# Patient Record
Sex: MALE | Race: WHITE | Employment: FULL TIME | URBAN - METROPOLITAN AREA
[De-identification: names, ages, dates, MRNs, and addresses within clinical notes are randomized per-mention and may not be internally consistent; named-entity substitution may affect disease eponyms.]

---
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
## 2019-01-07 ENCOUNTER — OFFICE VISIT (OUTPATIENT)
Dept: FAMILY MEDICINE | Facility: CLINIC | Age: 19
End: 2019-01-07
Payer: COMMERCIAL

## 2019-01-07 VITALS
SYSTOLIC BLOOD PRESSURE: 126 MMHG | WEIGHT: 281 LBS | HEART RATE: 75 BPM | DIASTOLIC BLOOD PRESSURE: 86 MMHG | BODY MASS INDEX: 37.24 KG/M2 | TEMPERATURE: 98.7 F | OXYGEN SATURATION: 96 % | HEIGHT: 73 IN

## 2019-01-07 DIAGNOSIS — F32.0 MAJOR DEPRESSIVE DISORDER, SINGLE EPISODE, MILD (H): Primary | ICD-10-CM

## 2019-01-07 DIAGNOSIS — E66.01 MORBID OBESITY (H): ICD-10-CM

## 2019-01-07 DIAGNOSIS — R03.0 ELEVATED BLOOD PRESSURE READING WITHOUT DIAGNOSIS OF HYPERTENSION: ICD-10-CM

## 2019-01-07 DIAGNOSIS — Z51.81 MEDICATION MONITORING ENCOUNTER: ICD-10-CM

## 2019-01-07 PROBLEM — E66.811 CLASS 1 OBESITY WITHOUT SERIOUS COMORBIDITY WITH BODY MASS INDEX (BMI) OF 34.0 TO 34.9 IN ADULT, UNSPECIFIED OBESITY TYPE: Status: ACTIVE | Noted: 2019-01-07

## 2019-01-07 PROCEDURE — 99214 OFFICE O/P EST MOD 30 MIN: CPT | Performed by: FAMILY MEDICINE

## 2019-01-07 ASSESSMENT — ANXIETY QUESTIONNAIRES
2. NOT BEING ABLE TO STOP OR CONTROL WORRYING: NOT AT ALL
GAD7 TOTAL SCORE: 2
7. FEELING AFRAID AS IF SOMETHING AWFUL MIGHT HAPPEN: NOT AT ALL
1. FEELING NERVOUS, ANXIOUS, OR ON EDGE: NOT AT ALL
6. BECOMING EASILY ANNOYED OR IRRITABLE: SEVERAL DAYS
5. BEING SO RESTLESS THAT IT IS HARD TO SIT STILL: NOT AT ALL
IF YOU CHECKED OFF ANY PROBLEMS ON THIS QUESTIONNAIRE, HOW DIFFICULT HAVE THESE PROBLEMS MADE IT FOR YOU TO DO YOUR WORK, TAKE CARE OF THINGS AT HOME, OR GET ALONG WITH OTHER PEOPLE: NOT DIFFICULT AT ALL
3. WORRYING TOO MUCH ABOUT DIFFERENT THINGS: SEVERAL DAYS

## 2019-01-07 ASSESSMENT — PATIENT HEALTH QUESTIONNAIRE - PHQ9
SUM OF ALL RESPONSES TO PHQ QUESTIONS 1-9: 5
5. POOR APPETITE OR OVEREATING: NOT AT ALL

## 2019-01-07 ASSESSMENT — MIFFLIN-ST. JEOR: SCORE: 2343.49

## 2019-01-07 NOTE — LETTER
My Depression Action Plan  Name: John Paul Durham   Date of Birth 2000  Date: 1/7/2019    My doctor: Lm Bear   My clinic: [address] [id number] [phone]          GREEN    ZONE   Good Control    What it looks like:     Things are going generally well. You have normal up s and down s. You may even feel depressed from time to time, but bad moods usually last less than a day.   What you need to do:  1. Continue to care for yourself (see self care plan)  2. Check your depression survival kit and update it as needed  3. Follow your physician s recommendations including any medication.  4. Do not stop taking medication unless you consult with your physician first.           YELLOW         ZONE Getting Worse    What it looks like:     Depression is starting to interfere with your life.     It may be hard to get out of bed; you may be starting to isolate yourself from others.    Symptoms of depression are starting to last most all day and this has happened for several days.     You may have suicidal thoughts but they are not constant.   What you need to do:     1. Call your care team, your response to treatment will improve if you keep your care team informed of your progress. Yellow periods are signs an adjustment may need to be made.     2. Continue your self-care, even if you have to fake it!    3. Talk to someone in your support network    4. Open up your depression survival kit           RED    ZONE Medical Alert - Get Help    What it looks like:     Depression is seriously interfering with your life.     You may experience these or other symptoms: You can t get out of bed most days, can t work or engage in other necessary activities, you have trouble taking care of basic hygiene, or basic responsibilities, thoughts of suicide or death that will not go away, self-injurious behavior.     What you need to do:  1. Call your care team  and request a same-day appointment. If they are not available (weekends or after hours) call your local crisis line, emergency room or 911.            Depression Self Care Plan / Survival Kit    Self-Care for Depression  Here s the deal. Your body and mind are really not as separate as most people think.  What you do and think affects how you feel and how you feel influences what you do and think. This means if you do things that people who feel good do, it will help you feel better.  Sometimes this is all it takes.  There is also a place for medication and therapy depending on how severe your depression is, so be sure to consult with your medical provider and/ or Behavioral Health Consultant if your symptoms are worsening or not improving.     In order to better manage my stress, I will:    Exercise  Get some form of exercise, every day. This will help reduce pain and release endorphins, the  feel good  chemicals in your brain. This is almost as good as taking antidepressants!  This is not the same as joining a gym and then never going! (they count on that by the way ) It can be as simple as just going for a walk or doing some gardening, anything that will get you moving.      Hygiene   Maintain good hygiene (Get out of bed in the morning, Make your bed, Brush your teeth, Take a shower, and Get dressed like you were going to work, even if you are unemployed).  If your clothes don't fit try to get ones that do.    Diet  I will strive to eat foods that are good for me, drink plenty of water, and avoid excessive sugar, caffeine, alcohol, and other mood-altering substances.  Some foods that are helpful in depression are: complex carbohydrates, B vitamins, flaxseed, fish or fish oil, fresh fruits and vegetables.    Psychotherapy  I agree to participate in Individual Therapy (if recommended).    Medication  If prescribed medications, I agree to take them.  Missing doses can result in serious side effects.  I understand  that drinking alcohol, or other illicit drug use, may cause potential side effects.  I will not stop my medication abruptly without first discussing it with my provider.    Staying Connected With Others  I will stay in touch with my friends, family members, and my primary care provider/team.    Use your imagination  Be creative.  We all have a creative side; it doesn t matter if it s oil painting, sand castles, or mud pies! This will also kick up the endorphins.    Witness Beauty  (AKA stop and smell the roses) Take a look outside, even in mid-winter. Notice colors, textures. Watch the squirrels and birds.     Service to others  Be of service to others.  There is always someone else in need.  By helping others we can  get out of ourselves  and remember the really important things.  This also provides opportunities for practicing all the other parts of the program.    Humor  Laugh and be silly!  Adjust your TV habits for less news and crime-drama and more comedy.    Control your stress  Try breathing deep, massage therapy, biofeedback, and meditation. Find time to relax each day.     My support system    Clinic Contact:  Phone number:    Contact 1:  Phone number:    Contact 2:  Phone number:    Voodoo/:  Phone number:    Therapist:  Phone number:    Local crisis center:    Phone number:    Other community support:  Phone number:

## 2019-01-07 NOTE — PATIENT INSTRUCTIONS
Prescription refilled today.     [facility]                        To reach your care team during and after hours:   [phone]  To reach our pharmacy:        [phone]    Clinic Hours                        Our clinic hours are:    Monday   7:30 am to 7:00 pm                  Tuesday through Friday 7:30 am to 5:00 pm                             Saturday   8:00 am to 12:00 pm      Sunday   Closed      Pharmacy Hours                        Our pharmacy hours are:    Monday   8:30 am to 7:00 pm       Tuesday to Friday  8:30 am to 6:00 pm                       Saturday    9:00 am to 1:00 pm              Sunday    Closed              There is also information available at our web site:  [email or web address]    If your provider ordered any lab tests and you do not receive the results within 10 business days, please call the clinic.    If you need a medication refill please contact your pharmacy.  Please allow 2-3 business days for your refill to be completed.    Our clinic offers telephone visits and e visits.  Please ask one of your team members to explain more.      Use [employer] (secure email communication and access to your chart) to send your primary care provider a message or make an appointment. Ask someone on your Team how to sign up for [employer].  Immunizations                      Immunization History   Administered Date(s) Administered     DTAP (<7y) 2000, 2000, 01/27/2000, 11/10/2000, 04/09/2004     HEPA 07/05/2011, 07/12/2013     HepB [birth year], 06/07/2000     Hib (PRP-T) 2000, 2000, 01/27/2000, 11/10/2000     Influenza (H1N1) 12/10/2009     Influenza (IIV3) PF 09/30/2010     Influenza Intranasal Vaccine 10/04/2010     MMR 11/10/2000, 04/09/2004     Meningococcal (Menactra ) 07/12/2013     Pneumo Conj 13-V (2010&after) 07/09/2015     Pneumococcal (PCV 7) 05/02/2001     Poliovirus, inactivated (IPV) 2000, 2000, 11/10/2000, 04/09/2004     TDAP  Vaccine (Boostrix) 07/05/2011     Tdap (Adacel,Boostrix) 07/05/2011     Varicella 11/10/2000, 07/05/2011        Health Maintenance                         Health Maintenance Due   Topic Date Due     HPV Vaccine (1 - Male 3-dose series) 07/26/2010     Meningitis A Vaccine (2 - 2-dose series) 07/26/2015     HIV SCREEN (SYSTEM ASSIGNED)  07/26/2017     Flu Vaccine (1) 09/01/2018

## 2019-01-09 ASSESSMENT — ANXIETY QUESTIONNAIRES: GAD7 TOTAL SCORE: 2

## 2019-01-30 ENCOUNTER — MYC MEDICAL ADVICE (OUTPATIENT)
Dept: FAMILY MEDICINE | Facility: CLINIC | Age: 19
End: 2019-01-30

## 2019-01-30 NOTE — TELEPHONE ENCOUNTER
Telephone Visit scheduled for 02/01/2019 @ 120pm with MD Citlali MERCHANT Message sent    Maria Vail RN  [facility]

## 2019-02-01 ENCOUNTER — VIRTUAL VISIT (OUTPATIENT)
Dept: FAMILY MEDICINE | Facility: CLINIC | Age: 19
End: 2019-02-01
Payer: COMMERCIAL

## 2019-02-01 DIAGNOSIS — F32.0 MAJOR DEPRESSIVE DISORDER, SINGLE EPISODE, MILD (H): Primary | ICD-10-CM

## 2019-02-01 DIAGNOSIS — Z51.81 MEDICATION MONITORING ENCOUNTER: ICD-10-CM

## 2019-02-01 DIAGNOSIS — F41.9 ANXIETY: ICD-10-CM

## 2019-02-01 PROCEDURE — 99441 ZZC PHYSICIAN TELEPHONE EVALUATION 5-10 MIN: CPT | Performed by: FAMILY MEDICINE

## 2019-02-01 ASSESSMENT — ANXIETY QUESTIONNAIRES
GAD7 TOTAL SCORE: 10
1. FEELING NERVOUS, ANXIOUS, OR ON EDGE: NEARLY EVERY DAY
2. NOT BEING ABLE TO STOP OR CONTROL WORRYING: NEARLY EVERY DAY
IF YOU CHECKED OFF ANY PROBLEMS ON THIS QUESTIONNAIRE, HOW DIFFICULT HAVE THESE PROBLEMS MADE IT FOR YOU TO DO YOUR WORK, TAKE CARE OF THINGS AT HOME, OR GET ALONG WITH OTHER PEOPLE: SOMEWHAT DIFFICULT
3. WORRYING TOO MUCH ABOUT DIFFERENT THINGS: NEARLY EVERY DAY
6. BECOMING EASILY ANNOYED OR IRRITABLE: NOT AT ALL
5. BEING SO RESTLESS THAT IT IS HARD TO SIT STILL: NOT AT ALL
7. FEELING AFRAID AS IF SOMETHING AWFUL MIGHT HAPPEN: NOT AT ALL

## 2019-02-01 ASSESSMENT — PATIENT HEALTH QUESTIONNAIRE - PHQ9
5. POOR APPETITE OR OVEREATING: SEVERAL DAYS
SUM OF ALL RESPONSES TO PHQ QUESTIONS 1-9: 9

## 2019-02-01 NOTE — PROGRESS NOTES
John Paul Durham is a 18 year old male who is being evaluated via a billable telephone visit.      John Paul Durham complains of    Chief Complaint   Patient presents with     Recheck Medication     2/1/19    Medication Followup of Zoloft    Taking Medication as prescribed: yes    Side Effects: None    Medication Helping Symptoms: NO-helped in the beginning but last week the depression was worse and the med increase hasn't helped    On 1/23, had noted a panic attack, has ha a few since, noted hyperventilation and crying, lasted 5-10 minutes - no panic attack since increasing dose - has been adding/dropping classes - schedule seems settled now - still notes some depression and anxiety - sleep fine - appetite normal - getting to class - getting homework done so far - counseling last spring, helpful?    Recent increase in Zoloft from 50 to 100 mg daily on 1/23    PHQ = 9 and SERGIO = 10    PHQ-9 SCORE 11/23/2018 1/7/2019 2/1/2019   PHQ-9 Total Score MyChart 2 (Minimal depression) - -   PHQ-9 Total Score 2 5 9     SERGIO-7 SCORE 11/23/2018 1/7/2019 2/1/2019   Total Score 1 (minimal anxiety) - -   Total Score 1 2 10     1/7/19    Medication Followup of Sertraline    Taking Medication as prescribed: yes    Side Effects:  None    Medication Helping Symptoms:  yes      Depression/Anxiety: Patient had a PHQ9 score of 5 and a GAD7 score of 2 today. Patient's depression/anxiety is well controlled. Patient is currently prescribed 50 mg Zoloft daily for depression/anxiety management. Patient reports no side effects from the medication.      I have reviewed and updated the patient's Past Medical History, Social History, Family History and Medication List.    ALLERGIES  Sulfa drugs and Zithromax [azithromycin dihydrate]    Assessment/Plan:    ICD-10-CM    1. Major depressive disorder, single episode, mild (H) F32.0    2. Anxiety F41.9    3. Medication monitoring encounter Z51.81          I have reviewed the note as documented above.   "This accurately captures the substance of my conversation with the patient.    Total time of call between patient and provider was 5-10 minutes     .       Lm Bear MD, FAAFP    [address]  [phone] Fax      The patient has been notified of following:     \"This telephone visit will be conducted via a call between you and your physician/provider. We have found that certain health care needs can be provided without the need for a physical exam.  This service lets us provide the care you need with a short phone conversation.  If a prescription is necessary we can send it directly to your pharmacy.  If lab work is needed we can place an order for that and you can then stop by our lab to have the test done at a later time.    If during the course of the call the physician/provider feels a telephone visit is not appropriate, you will not be charged for this service.\"     Consent has been obtained for this service by 1 care team member: yes. See the scanned image in the medical record.  Dixie Saldaña, Medical Assistant    "

## 2019-02-02 ASSESSMENT — ANXIETY QUESTIONNAIRES: GAD7 TOTAL SCORE: 10

## 2019-03-11 ENCOUNTER — OFFICE VISIT (OUTPATIENT)
Dept: FAMILY MEDICINE | Facility: CLINIC | Age: 19
End: 2019-03-11
Payer: COMMERCIAL

## 2019-03-11 VITALS
TEMPERATURE: 98.7 F | BODY MASS INDEX: 37.24 KG/M2 | HEART RATE: 78 BPM | HEIGHT: 73 IN | WEIGHT: 281 LBS | DIASTOLIC BLOOD PRESSURE: 78 MMHG | OXYGEN SATURATION: 98 % | SYSTOLIC BLOOD PRESSURE: 132 MMHG

## 2019-03-11 DIAGNOSIS — F32.0 MAJOR DEPRESSIVE DISORDER, SINGLE EPISODE, MILD (H): Primary | ICD-10-CM

## 2019-03-11 DIAGNOSIS — Z51.81 MEDICATION MONITORING ENCOUNTER: ICD-10-CM

## 2019-03-11 DIAGNOSIS — F41.9 ANXIETY: ICD-10-CM

## 2019-03-11 DIAGNOSIS — E66.01 MORBID OBESITY (H): ICD-10-CM

## 2019-03-11 PROCEDURE — 99214 OFFICE O/P EST MOD 30 MIN: CPT | Performed by: FAMILY MEDICINE

## 2019-03-11 ASSESSMENT — ANXIETY QUESTIONNAIRES
3. WORRYING TOO MUCH ABOUT DIFFERENT THINGS: MORE THAN HALF THE DAYS
2. NOT BEING ABLE TO STOP OR CONTROL WORRYING: MORE THAN HALF THE DAYS
5. BEING SO RESTLESS THAT IT IS HARD TO SIT STILL: NOT AT ALL
IF YOU CHECKED OFF ANY PROBLEMS ON THIS QUESTIONNAIRE, HOW DIFFICULT HAVE THESE PROBLEMS MADE IT FOR YOU TO DO YOUR WORK, TAKE CARE OF THINGS AT HOME, OR GET ALONG WITH OTHER PEOPLE: SOMEWHAT DIFFICULT
6. BECOMING EASILY ANNOYED OR IRRITABLE: NOT AT ALL
GAD7 TOTAL SCORE: 6
1. FEELING NERVOUS, ANXIOUS, OR ON EDGE: MORE THAN HALF THE DAYS
7. FEELING AFRAID AS IF SOMETHING AWFUL MIGHT HAPPEN: NOT AT ALL

## 2019-03-11 ASSESSMENT — PATIENT HEALTH QUESTIONNAIRE - PHQ9
5. POOR APPETITE OR OVEREATING: NOT AT ALL
SUM OF ALL RESPONSES TO PHQ QUESTIONS 1-9: 3

## 2019-03-11 ASSESSMENT — MIFFLIN-ST. JEOR: SCORE: 2343.49

## 2019-03-11 NOTE — PROGRESS NOTES
"  SUBJECTIVE:   John Paul Durham is a 18 year old male who presents to clinic today for the following health issues:    Depression and Anxiety Follow-Up  Patient had a PHQ9 score of 3 and a GAD7 score of 6 today. Patient's depression/anxiety is moderately controlled. Patient is currently prescribed 100 mg Zoloft daily for depression/anxiety management. Patient reports normal sleep and normal appetite. Patient also states that he has not had any panic attacks \"in a long time.\"     Status since last visit: Improved     Other associated symptoms:no panic attacks for awhile    Complicating factors:     Significant life event: No     Current substance abuse: None    PHQ 1/7/2019 2/1/2019 3/11/2019   PHQ-9 Total Score 5 9 3   Q9: Suicide Ideation Not at all Not at all Not at all     SERGIO-7 SCORE 1/7/2019 2/1/2019 3/11/2019   Total Score - - -   Total Score 2 10 6     PHQ-9  English  PHQ-9   Any Language  SERGIO-7  Suicide Assessment Five-step Evaluation and Treatment (SAFE-T)    Problem list and histories reviewed & adjusted, as indicated.  Additional history: as documented    BP Readings from Last 3 Encounters:   03/11/19 132/78   01/07/19 126/86   03/05/18 116/82       body mass index is 37.07 kg/m .    Wt Readings from Last 4 Encounters:   03/11/19 127.5 kg (281 lb) (>99 %)*   01/07/19 127.5 kg (281 lb) (>99 %)*   03/05/18 120.7 kg (266 lb 2 oz) (>99 %)*   02/13/17 100.2 kg (221 lb) (98 %)*     * Growth percentiles are based on CDC (Boys, 2-20 Years) data.       Health Maintenance    Health Maintenance Due   Topic Date Due     HPV IMMUNIZATION (1 - Male 3-dose series) 07/26/2014     MENINGITIS IMMUNIZATION (2 - 2-dose series) 07/26/2015     PREVENTIVE CARE VISIT  07/09/2016     HIV SCREEN (SYSTEM ASSIGNED)  07/26/2017       Current Problem List    Patient Active Problem List   Diagnosis     Allergic state     Major depressive disorder, single episode, mild (H)     Morbid obesity (H)     Anxiety       Past Medical " History    Past Medical History:   Diagnosis Date     Anxiety      heart murmur congenital    asymptomatic     Major depressive disorder, single episode, mild (H)        Past Surgical History    Past Surgical History:   Procedure Laterality Date     NO HISTORY OF SURGERY         Current Medications    Current Outpatient Medications   Medication Sig Dispense Refill     sertraline (ZOLOFT) 100 MG tablet Take 1 tablet (100 mg) by mouth daily 90 tablet 1       Allergies    Allergies   Allergen Reactions     Sulfa Drugs Rash     Red and blotchy rash, happened when pt was a few years old     Zithromax [Azithromycin Dihydrate] Rash     Red and blotchy rash, happened when pt was a few years old.       Immunizations    Immunization History   Administered Date(s) Administered     DTAP (<7y) 1999, 1999, 01/27/2000, 11/10/2000, 04/09/2004     HEPA 07/05/2011, 07/12/2013     HepB 1999, 1999, 06/07/2000     Hib (PRP-T) 1999, 1999, 01/27/2000, 11/10/2000     Influenza (H1N1) 12/10/2009     Influenza (IIV3) PF 09/30/2010     Influenza Intranasal Vaccine 10/04/2010     MMR 11/10/2000, 04/09/2004     Meningococcal (Menactra ) 07/12/2013     Pneumo Conj 13-V (2010&after) 07/09/2015     Pneumococcal (PCV 7) 05/02/2001     Poliovirus, inactivated (IPV) 1999, 1999, 11/10/2000, 04/09/2004     TDAP Vaccine (Boostrix) 07/05/2011     Tdap (Adacel,Boostrix) 07/05/2011     Varicella 11/10/2000, 07/05/2011       Family History    Family History   Problem Relation Age of Onset     C.A.D. Maternal Grandfather      Brain Cancer Paternal Grandfather         Skin cancer origin?     Depression Maternal Grandmother         as well as other maternal side relatives       Social History    Social History     Socioeconomic History     Marital status: Single     Spouse name: Not on file     Number of children: 0     Years of education: Not on file     Highest education level: Not on file   Occupational  History     Occupation: [employer]     Employer: STUDENT     Comment: Biology   Social Needs     Financial resource strain: Not on file     Food insecurity:     Worry: Not on file     Inability: Not on file     Transportation needs:     Medical: Not on file     Non-medical: Not on file   Tobacco Use     Smoking status: Never Smoker     Smokeless tobacco: Never Used   Substance and Sexual Activity     Alcohol use: Yes     Alcohol/week: 0.0 oz     Comment: 1 time per month     Drug use: No     Sexual activity: No   Lifestyle     Physical activity:     Days per week: Not on file     Minutes per session: Not on file     Stress: Not on file   Relationships     Social connections:     Talks on phone: Not on file     Gets together: Not on file     Attends Christian service: Not on file     Active member of club or organization: Not on file     Attends meetings of clubs or organizations: Not on file     Relationship status: Not on file     Intimate partner violence:     Fear of current or ex partner: Not on file     Emotionally abused: Not on file     Physically abused: Not on file     Forced sexual activity: Not on file   Other Topics Concern      Service Not Asked     Blood Transfusions Not Asked     Caffeine Concern Yes     Comment: rare     Occupational Exposure Not Asked     Hobby Hazards Not Asked     Sleep Concern Not Asked     Stress Concern Not Asked     Weight Concern Not Asked     Special Diet Not Asked     Back Care Not Asked     Exercise Yes     Comment: FB -2 hrs > 4 times per week     Bike Helmet Yes     Seat Belt Yes     Self-Exams Not Asked   Social History Narrative     Not on file       All above reviewed and updated, all stable unless otherwise noted    Recent labs reviewed    ROS:  Constitutional, HEENT, cardiovascular, pulmonary, GI, , musculoskeletal, neuro, skin, endocrine and psych systems are negative, except as otherwise noted.    OBJECTIVE:                                           "          /78   Pulse 78   Temp 98.7  F (37.1  C) (Oral)   Ht 1.854 m (6' 1\")   Wt 127.5 kg (281 lb)   SpO2 98%   BMI 37.07 kg/m    Body mass index is 37.07 kg/m .  GENERAL: healthy, alert and no distress  EYES: Eyes grossly normal to inspection  HENT:ear canals and TM's normal upon viewing with otoscope, nose and mouth without ulcers or lesions upon viewing with otoscope  NECK: no tenderness, no adenopathy, no asymmetry, no masses, no stiffness; thyroid- normal to palpation  RESP: lungs clear to auscultation - no rales, no rhonchi, no wheezes  CV: regular rates and rhythm, normal S1 S2, no S3 or S4 and no murmur, no click or rub -  ABDOMEN: soft, no tenderness, no  hepatosplenomegaly, no masses, normal bowel sounds  MS: extremities- no gross deformities noted, no edema  SKIN: no suspicious lesions, no rashes  NEURO: strength and tone- normal, sensory exam- grossly normal, mentation- intact, speech- normal  BACK: no CVA tenderness, no paralumbar tenderness  PSYCH: Alert and oriented times 3; speech- coherent , normal rate and volume; able to articulate logical thoughts, able to abstract reason, no tangential thoughts, no hallucinations or delusions, affect- normal    DIAGNOSTICS/PROCEDURES:                                                      No results found for this or any previous visit (from the past 24 hour(s)).     ASSESSMENT/PLAN:                                                        ICD-10-CM    1. Major depressive disorder, single episode, mild (H) F32.0    2. Anxiety F41.9    3. Morbid obesity (H) E66.01    4. Medication monitoring encounter Z51.81        Discussed treatment/modality options, including risk and benefits, he desires advised 1 multivitamin per day, advised dentist every 6 months and advised diet and exercise. All diagnosis above reviewed and noted above, otherwise stable.  See [facility] orders for further details.  Follow up as needed.    1) Patient had a PHQ9 score of 3 and a " GAD7 score of 6 today. Patient's depression/anxiety is moderately controlled. Patient is currently prescribed 100 mg Zoloft daily for depression/anxiety management. Advised continued use. Refill completed.    2) Follow up in 3-4 months for medication recheck visit.    Health Maintenance Due   Topic Date Due     HPV IMMUNIZATION (1 - Male 3-dose series) 07/26/2014     MENINGITIS IMMUNIZATION (2 - 2-dose series) 07/26/2015     PREVENTIVE CARE VISIT  07/09/2016     HIV SCREEN (SYSTEM ASSIGNED)  07/26/2017     This document serves as a record of the services and decisions personally performed and made by Lm Bear MD. It was created on his behalf by Ernesto Jerez, a trained medical scribe. The creation of this document is based on the provider's statements to the medical scribe.  Ernesto Jerez March 11, 2019 11:29 AM     The information in this document, created by the medical scribe for me, accurately reflects the services I personally performed and the decisions made by me. I have reviewed and approved this document for accuracy prior to leaving the patient care area.  March 11, 2019            Lm Bear MD [address]  [id number] [phone] Fax

## 2019-03-11 NOTE — PATIENT INSTRUCTIONS
[facility]                        To reach your care team during and after hours:   [phone]  To reach our pharmacy:        [phone]    Clinic Hours                        Our clinic hours are:    Monday   7:30 am to 7:00 pm                  Tuesday through Friday 7:30 am to 5:00 pm                             Saturday   8:00 am to 12:00 pm      Sunday   Closed      Pharmacy Hours                        Our pharmacy hours are:    Monday   8:30 am to 7:00 pm       Tuesday to Friday  8:30 am to 6:00 pm                       Saturday    9:00 am to 1:00 pm              Sunday    Closed              There is also information available at our web site:  [email or web address]    If your provider ordered any lab tests and you do not receive the results within 10 business days, please call the clinic.    If you need a medication refill please contact your pharmacy.  Please allow 2-3 business days for your refill to be completed.    Our clinic offers telephone visits and e visits.  Please ask one of your team members to explain more.      Use [employer] (secure email communication and access to your chart) to send your primary care provider a message or make an appointment. Ask someone on your Team how to sign up for [employer].  Immunizations                      Immunization History   Administered Date(s) Administered     DTAP (<7y) 1999, 1999, 01/27/2000, 11/10/2000, 04/09/2004     HEPA 07/05/2011, 07/12/2013     HepB 1999, 1999, 06/07/2000     Hib (PRP-T) 1999, 1999, 01/27/2000, 11/10/2000     Influenza (H1N1) 12/10/2009     Influenza (IIV3) PF 09/30/2010     Influenza Intranasal Vaccine 10/04/2010     MMR 11/10/2000, 04/09/2004     Meningococcal (Menactra ) 07/12/2013     Pneumo Conj 13-V (2010&after) 07/09/2015     Pneumococcal (PCV 7) 05/02/2001     Poliovirus, inactivated (IPV) 1999, 1999, 11/10/2000, 04/09/2004     TDAP Vaccine (Boostrix) 07/05/2011      Tdap (Adacel,Boostrix) 07/05/2011     Varicella 11/10/2000, 07/05/2011        Health Maintenance                         Health Maintenance Due   Topic Date Due     HPV Vaccine (1 - Male 3-dose series) 07/26/2014     Meningitis A Vaccine (2 - 2-dose series) 07/26/2015     Preventive Care Visit  07/09/2016     HIV SCREEN (SYSTEM ASSIGNED)  07/26/2017

## 2019-03-12 ASSESSMENT — ANXIETY QUESTIONNAIRES: GAD7 TOTAL SCORE: 6

## 2019-08-28 DIAGNOSIS — F32.0 MAJOR DEPRESSIVE DISORDER, SINGLE EPISODE, MILD (H): ICD-10-CM

## 2019-08-28 RX ORDER — SERTRALINE HYDROCHLORIDE 100 MG/1
TABLET, FILM COATED ORAL
Qty: 90 TABLET | Refills: 0 | Status: SHIPPED | OUTPATIENT
Start: 2019-08-28 | End: 2019-11-01

## 2019-08-28 NOTE — TELEPHONE ENCOUNTER
03/11/2019 OV Notes:   Follow up in 3-4 months for medication recheck visit.    Patient also due for PHQ9/GAD7 in 1 month.     Medication is being filled for 1 time refill only due to:  Patient needs to be seen because due for med check.    Maria Vail RN  [facility]

## 2019-08-28 NOTE — TELEPHONE ENCOUNTER
"Requested Prescriptions   Pending Prescriptions Disp Refills     sertraline (ZOLOFT) 100 MG tablet [Pharmacy Med Name: SERTRALINE HCL 100MG TABS]          Last Written Prescription Date:  1.24.19  Last Fill Quantity: 90 tablet,  # refills: 1   Last office visit: 3/11/2019 with prescribing provider:  Lm Bear MD             Future Office Visit:       90 tablet 1     Sig: TAKE ONE TABLET BY MOUTH ONCE DAILY       SSRIs Protocol Passed - 8/28/2019  5:03 AM        Passed - PHQ-9 score less than 5 in past 6 months     Please review last PHQ-9 score.       PHQ-9 SCORE 1/7/2019 2/1/2019 3/11/2019   PHQ-9 Total Score MyChart - - -   PHQ-9 Total Score 5 9 3     SERGIO-7 SCORE 1/7/2019 2/1/2019 3/11/2019   Total Score - - -   Total Score 2 10 6                   Passed - Medication is active on med list        Passed - Patient is age 18 or older        Passed - Recent (6 mo) or future (30 days) visit within the authorizing provider's specialty     Patient had office visit in the last 6 months or has a visit in the next 30 days with authorizing provider or within the authorizing provider's specialty.  See \"Patient Info\" tab in inbasket, or \"Choose Columns\" in Meds & Orders section of the refill encounter.            "

## 2019-10-28 ENCOUNTER — MYC MEDICAL ADVICE (OUTPATIENT)
Dept: FAMILY MEDICINE | Facility: CLINIC | Age: 19
End: 2019-10-28

## 2019-10-29 NOTE — TELEPHONE ENCOUNTER
LOV 3/11/2019      sertraline (ZOLOFT) 100 MG tablet 90 tablet 0 8/28/2019  No   Sig: TAKE ONE TABLET BY MOUTH ONCE DAILY   Sent to pharmacy as: sertraline (ZOLOFT) 100 MG tablet     PHQ-9 SCORE 1/7/2019 2/1/2019 3/11/2019   PHQ-9 Total Score MyChart - - -   PHQ-9 Total Score 5 9 3     SERGIO-7 SCORE 1/7/2019 2/1/2019 3/11/2019   Total Score - - -   Total Score 2 10 6       My chart message sent with questionnaires, awaiting reply back     Yina Cooper RN, BSN  [place] [facility]

## 2019-10-29 NOTE — TELEPHONE ENCOUNTER
Please see my chart message below     Please review updated questionsires     PHQ-9 SCORE 2/1/2019 3/11/2019 10/29/2019   PHQ-9 Total Score MyChart - - 7 (Mild depression)   PHQ-9 Total Score 9 3 7     SERGIO-7 SCORE 2/1/2019 3/11/2019 10/29/2019   Total Score - - 8 (mild anxiety)   Total Score 10 6 8       Thank you     Yina Cooper RN, BSN  [place] [facility]

## 2019-11-01 ENCOUNTER — VIRTUAL VISIT (OUTPATIENT)
Dept: FAMILY MEDICINE | Facility: CLINIC | Age: 19
End: 2019-11-01

## 2019-11-01 DIAGNOSIS — F32.0 MAJOR DEPRESSIVE DISORDER, SINGLE EPISODE, MILD (H): Primary | ICD-10-CM

## 2019-11-01 DIAGNOSIS — F41.9 ANXIETY: ICD-10-CM

## 2019-11-01 DIAGNOSIS — Z51.81 MEDICATION MONITORING ENCOUNTER: ICD-10-CM

## 2019-11-01 PROCEDURE — 99441 ZZC PHYSICIAN TELEPHONE EVALUATION 5-10 MIN: CPT | Performed by: FAMILY MEDICINE

## 2019-11-01 RX ORDER — SERTRALINE HYDROCHLORIDE 100 MG/1
150 TABLET, FILM COATED ORAL DAILY
Qty: 135 TABLET | Refills: 3 | Status: SHIPPED | OUTPATIENT
Start: 2019-11-01 | End: 2019-11-29

## 2019-11-01 ASSESSMENT — ANXIETY QUESTIONNAIRES
1. FEELING NERVOUS, ANXIOUS, OR ON EDGE: MORE THAN HALF THE DAYS
3. WORRYING TOO MUCH ABOUT DIFFERENT THINGS: SEVERAL DAYS
IF YOU CHECKED OFF ANY PROBLEMS ON THIS QUESTIONNAIRE, HOW DIFFICULT HAVE THESE PROBLEMS MADE IT FOR YOU TO DO YOUR WORK, TAKE CARE OF THINGS AT HOME, OR GET ALONG WITH OTHER PEOPLE: SOMEWHAT DIFFICULT
5. BEING SO RESTLESS THAT IT IS HARD TO SIT STILL: NOT AT ALL
2. NOT BEING ABLE TO STOP OR CONTROL WORRYING: SEVERAL DAYS
6. BECOMING EASILY ANNOYED OR IRRITABLE: SEVERAL DAYS
GAD7 TOTAL SCORE: 7
7. FEELING AFRAID AS IF SOMETHING AWFUL MIGHT HAPPEN: SEVERAL DAYS

## 2019-11-01 ASSESSMENT — PATIENT HEALTH QUESTIONNAIRE - PHQ9
SUM OF ALL RESPONSES TO PHQ QUESTIONS 1-9: 6
5. POOR APPETITE OR OVEREATING: SEVERAL DAYS

## 2019-11-01 NOTE — PROGRESS NOTES
TELEPHONE VISIT:                                                      John Paul Durham is a 19 year old male who is being evaluated via a telephone visit.      S: The history as provided by the patient to the provider during this call include    On Zoloft 100 mg daily, going well until last week, increasing depression and anxiety sx - increased worry - increased sleep - feeling bad about himself - no self harm thoughts - still not as bad as it has been  - [facility] terms at [facility] - reviewed options - desires to increase zoloft, hold on counseling    PHQ-9 SCORE 3/11/2019 10/29/2019 11/1/2019   PHQ-9 Total Score MyChart - 7 (Mild depression) -   PHQ-9 Total Score 3 7 6       SERGIO-7 SCORE 3/11/2019 10/29/2019 11/1/2019   Total Score - 8 (mild anxiety) -   Total Score 6 8 7     Pertinent parts of the the patient's medical history reviewed and confirmed by the provider included : chart review     ===================================================    I have reviewed the note as documented above.  This accurately captures the substance of my conversation with the patient      ASSESSMENT:                                                        ICD-10-CM    1. Major depressive disorder, single episode, mild (H) F32.0 sertraline (ZOLOFT) 100 MG tablet   2. Anxiety F41.9    3. Medication monitoring encounter Z51.81          PLAN:                                                      Discussed treatment/modality options, including risk and benefits, he desires:    Increase zoloft to 150 mg, refill done.  Hold on counseling.  Close follow up - 3-4 weeks.  Vitamin D    All diagnosis above reviewed and noted above, otherwise stable.      See [facility] orders for further details.      Health Maintenance Due   Topic Date Due     HIV SCREENING  07/26/2014     HPV IMMUNIZATION (1 - Male 3-dose series) 07/26/2014     PREVENTIVE CARE VISIT  07/09/2016     Total time of call between patient and provider was 5-10 minutes     CONSENT:               "                                        \"We have found that certain health care needs can be provided without the need for a physical exam.  This service lets us provide the care you need with a short phone conversation.  If a prescription is necessary we can send it directly to your pharmacy.  If lab work is needed we can place an order for that and you can then stop by our lab to have the test done at a later time.    This telephone visit will be conducted via 3 way call with the you (the patient) , the physician/provider, and a me all on the line at the same time.  This allows your physician/provider to have the phone conversation with you while I will be taking notes for your medical record.  We will have full access to your [facility] medical record during this entire phone call.    Since this is like an office visit,  will bill your insurance company for this service.  Please check with your medical insurance if this type of telephone/virtual is covered . You may be responsible for the cost of this service if insurance coverage is denied.  The typical cost is $30 (10min), $59(11-20min) and $85 (21-30min)     If during the course of the call the physician/provider feels a telephone visit is not appropriate, you will not be charged for this service\"    Consent has been obtained for this service by care team member: yes.  See the scanned image in the medical record.      The patient has been notified of following (by LYNDA Saldaña Medical Assistant            "

## 2019-11-02 ASSESSMENT — ANXIETY QUESTIONNAIRES: GAD7 TOTAL SCORE: 7

## 2019-11-26 DIAGNOSIS — F32.0 MAJOR DEPRESSIVE DISORDER, SINGLE EPISODE, MILD (H): ICD-10-CM

## 2019-11-26 RX ORDER — SERTRALINE HYDROCHLORIDE 100 MG/1
TABLET, FILM COATED ORAL
Qty: 30 TABLET | Refills: 0 | Status: SHIPPED | OUTPATIENT
Start: 2019-11-26 | End: 2019-11-29 | Stop reason: DRUGHIGH

## 2019-11-26 NOTE — TELEPHONE ENCOUNTER
"sertraline (ZOLOFT) 100 MG tablet 135 tablet 3 11/1/2019  No   Sig - Route: Take 1.5 tablets (150 mg) by mouth daily - Oral   Sent to pharmacy as: sertraline (ZOLOFT) 100 MG tablet       Last office visit: 11/1/2019  Future Office Visit:   Next 5 appointments (look out 90 days)    Nov 29, 2019  9:30 AM CST  [facility] Visit with Lm Bear MD  [facility] [address] [place] [address] [phone]         Requested Prescriptions   Pending Prescriptions Disp Refills     sertraline (ZOLOFT) 100 MG tablet [Pharmacy Med Name: SERTRALINE HCL 100MG TABS] 90 tablet 0     Sig: TAKE ONE TABLET BY MOUTH ONCE DAILY       SSRIs Protocol Failed - 11/26/2019  9:04 AM        Failed - PHQ-9 score less than 5 in past 6 months     Please review last PHQ-9 score.     PHQ-9 SCORE 3/11/2019 10/29/2019 11/1/2019   PHQ-9 Total Score MyChart - 7 (Mild depression) -   PHQ-9 Total Score 3 7 6     SERGIO-7 SCORE 3/11/2019 10/29/2019 11/1/2019   Total Score - 8 (mild anxiety) -   Total Score 6 8 7                 Passed - Medication is active on med list        Passed - Patient is age 18 or older        Passed - Recent (6 mo) or future (30 days) visit within the authorizing provider's specialty     Patient had office visit in the last 6 months or has a visit in the next 30 days with authorizing provider or within the authorizing provider's specialty.  See \"Patient Info\" tab in inbasket, or \"Choose Columns\" in Meds & Orders section of the refill encounter.            Due for an Office visit for further refills, only fill for 30 days     Yina Cooper RN, BSN  [place] [facility]       "

## 2019-11-29 ENCOUNTER — OFFICE VISIT (OUTPATIENT)
Dept: FAMILY MEDICINE | Facility: CLINIC | Age: 19
End: 2019-11-29
Payer: COMMERCIAL

## 2019-11-29 VITALS
BODY MASS INDEX: 35.12 KG/M2 | SYSTOLIC BLOOD PRESSURE: 128 MMHG | HEIGHT: 73 IN | TEMPERATURE: 98.1 F | HEART RATE: 92 BPM | DIASTOLIC BLOOD PRESSURE: 78 MMHG | WEIGHT: 265 LBS | OXYGEN SATURATION: 97 %

## 2019-11-29 DIAGNOSIS — E66.811 CLASS 1 OBESITY WITH SERIOUS COMORBIDITY AND BODY MASS INDEX (BMI) OF 34.0 TO 34.9 IN ADULT, UNSPECIFIED OBESITY TYPE: ICD-10-CM

## 2019-11-29 DIAGNOSIS — F41.9 ANXIETY: ICD-10-CM

## 2019-11-29 DIAGNOSIS — Z51.81 MEDICATION MONITORING ENCOUNTER: ICD-10-CM

## 2019-11-29 DIAGNOSIS — F32.0 MAJOR DEPRESSIVE DISORDER, SINGLE EPISODE, MILD (H): Primary | ICD-10-CM

## 2019-11-29 PROCEDURE — 99214 OFFICE O/P EST MOD 30 MIN: CPT | Performed by: FAMILY MEDICINE

## 2019-11-29 RX ORDER — SERTRALINE HYDROCHLORIDE 100 MG/1
200 TABLET, FILM COATED ORAL DAILY
Qty: 180 TABLET | Refills: 3 | Status: SHIPPED | OUTPATIENT
Start: 2019-11-29 | End: 2020-01-14

## 2019-11-29 ASSESSMENT — PATIENT HEALTH QUESTIONNAIRE - PHQ9
SUM OF ALL RESPONSES TO PHQ QUESTIONS 1-9: 11
5. POOR APPETITE OR OVEREATING: NOT AT ALL

## 2019-11-29 ASSESSMENT — ANXIETY QUESTIONNAIRES
6. BECOMING EASILY ANNOYED OR IRRITABLE: SEVERAL DAYS
3. WORRYING TOO MUCH ABOUT DIFFERENT THINGS: SEVERAL DAYS
GAD7 TOTAL SCORE: 6
1. FEELING NERVOUS, ANXIOUS, OR ON EDGE: SEVERAL DAYS
2. NOT BEING ABLE TO STOP OR CONTROL WORRYING: SEVERAL DAYS
7. FEELING AFRAID AS IF SOMETHING AWFUL MIGHT HAPPEN: SEVERAL DAYS
5. BEING SO RESTLESS THAT IT IS HARD TO SIT STILL: SEVERAL DAYS
IF YOU CHECKED OFF ANY PROBLEMS ON THIS QUESTIONNAIRE, HOW DIFFICULT HAVE THESE PROBLEMS MADE IT FOR YOU TO DO YOUR WORK, TAKE CARE OF THINGS AT HOME, OR GET ALONG WITH OTHER PEOPLE: SOMEWHAT DIFFICULT

## 2019-11-29 ASSESSMENT — MIFFLIN-ST. JEOR: SCORE: 2265.91

## 2019-11-29 NOTE — PROGRESS NOTES
SUBJECTIVE:                                                    John Paul Durham is a 19 year old male who presents to clinic today for the following health issues:    Depression and Anxiety Follow-Up    Patient had a PHQ9 score of 11 and a GAD7 score of 6 today. Patient's depression/anxiety is moderately controlled. Patient is currently prescribed 150 mg Zoloft daily for depression/anxiety management.    How are you doing with your depression since your last visit? Improved , but not great    How are you doing with your anxiety since your last visit?  Improved     Are you having other symptoms that might be associated with depression or anxiety? No    Have you had a significant life event? No     Do you have any concerns with your use of alcohol or other drugs? No    Social History     Tobacco Use     Smoking status: Never Smoker     Smokeless tobacco: Never Used   Substance Use Topics     Alcohol use: Yes     Alcohol/week: 0.0 standard drinks     Comment: 1 time per month     Drug use: No     PHQ 10/29/2019 11/1/2019 11/29/2019   PHQ-9 Total Score 7 6 11   Q9: Thoughts of better off dead/self-harm past 2 weeks Not at all Not at all Several days   F/U: Thoughts of suicide or self-harm - - Yes   F/U: Self harm-plan - - No   F/U: Self-harm action - - No   F/U: Safety concerns - - No     SERGIO-7 SCORE 10/29/2019 11/1/2019 11/29/2019   Total Score 8 (mild anxiety) - -   Total Score 8 7 6     Suicide Assessment Five-step Evaluation and Treatment (SAFE-T)    11/1/19    On Zoloft 100 mg daily, going well until last week, increasing depression and anxiety sx - increased worry - increased sleep - feeling bad about himself - no self harm thoughts - still not as bad as it has been  - mid terms at [facility] - reviewed options - desires to increase zoloft, hold on counseling    Reviewed and updated as needed this visit by Provider       BP Readings from Last 3 Encounters:   11/29/19 128/78   03/11/19 132/78   01/07/19 126/86        body mass index is 34.96 kg/m .    Wt Readings from Last 4 Encounters:   11/29/19 120.2 kg (265 lb)   03/11/19 127.5 kg (281 lb) (>99 %)*   01/07/19 127.5 kg (281 lb) (>99 %)*   03/05/18 120.7 kg (266 lb 2 oz) (>99 %)*     * Growth percentiles are based on CDC (Boys, 2-20 Years) data.       Health Maintenance    Health Maintenance Due   Topic Date Due     HPV IMMUNIZATION (1 - Male 2-dose series) 07/26/2010     HIV SCREENING  07/26/2014     PREVENTIVE CARE VISIT  07/09/2016       Current Problem List    Patient Active Problem List   Diagnosis     Allergic state     Major depressive disorder, single episode, mild (H)     Anxiety     Class 1 obesity with serious comorbidity and body mass index (BMI) of 34.0 to 34.9 in adult, unspecified obesity type       Past Medical History    Past Medical History:   Diagnosis Date     Anxiety      heart murmur congenital    asymptomatic     Major depressive disorder, single episode, mild (H)        Past Surgical History    Past Surgical History:   Procedure Laterality Date     NO HISTORY OF SURGERY         Current Medications    Current Outpatient Medications   Medication Sig Dispense Refill     sertraline (ZOLOFT) 100 MG tablet Take 2 tablets (200 mg) by mouth daily 180 tablet 3       Allergies    Allergies   Allergen Reactions     Sulfa Drugs Rash     Red and blotchy rash, happened when pt was a few years old     Zithromax [Azithromycin Dihydrate] Rash     Red and blotchy rash, happened when pt was a few years old.       Immunizations    Immunization History   Administered Date(s) Administered     DTAP (<7y) 1999, 1999, 01/27/2000, 11/10/2000, 04/09/2004     HEPA 07/05/2011, 07/12/2013     HepB 1999, 1999, 06/07/2000     Hib (PRP-T) 1999, 1999, 01/27/2000, 11/10/2000     Influenza (H1N1) 12/10/2009     Influenza (IIV3) PF 09/30/2010     Influenza Intranasal Vaccine 10/04/2010     MMR 11/10/2000, 04/09/2004     Meningococcal (Menactra )  07/12/2013     Pneumo Conj 13-V (2010&after) 07/09/2015     Pneumococcal (PCV 7) 05/02/2001     Poliovirus, inactivated (IPV) 1999, 1999, 11/10/2000, 04/09/2004     TDAP Vaccine (Boostrix) 07/05/2011     Tdap (Adacel,Boostrix) 07/05/2011     Varicella 11/10/2000, 07/05/2011       Family History    Family History   Problem Relation Age of Onset     C.A.D. Maternal Grandfather      Brain Cancer Paternal Grandfather         Skin cancer origin?     Depression Maternal Grandmother         as well as other maternal side relatives       Social History    Social History     Socioeconomic History     Marital status: Single     Spouse name: Not on file     Number of children: 0     Years of education: Not on file     Highest education level: Not on file   Occupational History     Occupation: [employer]     Employer: STUDENT     Comment: Biology   Social Needs     Financial resource strain: Not on file     Food insecurity:     Worry: Not on file     Inability: Not on file     Transportation needs:     Medical: Not on file     Non-medical: Not on file   Tobacco Use     Smoking status: Never Smoker     Smokeless tobacco: Never Used   Substance and Sexual Activity     Alcohol use: Yes     Alcohol/week: 0.0 standard drinks     Comment: 1 time per month     Drug use: No     Sexual activity: Never   Lifestyle     Physical activity:     Days per week: Not on file     Minutes per session: Not on file     Stress: Not on file   Relationships     Social connections:     Talks on phone: Not on file     Gets together: Not on file     Attends Sikhism service: Not on file     Active member of club or organization: Not on file     Attends meetings of clubs or organizations: Not on file     Relationship status: Not on file     Intimate partner violence:     Fear of current or ex partner: Not on file     Emotionally abused: Not on file     Physically abused: Not on file     Forced sexual activity: Not on file   Other Topics  "Concern      Service Not Asked     Blood Transfusions Not Asked     Caffeine Concern Yes     Comment: rare     Occupational Exposure Not Asked     Hobby Hazards Not Asked     Sleep Concern Not Asked     Stress Concern Not Asked     Weight Concern Not Asked     Special Diet Not Asked     Back Care Not Asked     Exercise Yes     Comment: FB -2 hrs > 4 times per week     Bike Helmet Yes     Seat Belt Yes     Self-Exams Not Asked   Social History Narrative     Not on file       All above reviewed and updated, all stable unless otherwise noted    Recent labs reviewed    ROS:  Constitutional, HEENT, cardiovascular, pulmonary, GI, , musculoskeletal, neuro, skin, endocrine and psych systems are negative, except as otherwise noted.    OBJECTIVE:                                                    /78   Pulse 92   Temp 98.1  F (36.7  C) (Tympanic)   Ht 1.854 m (6' 1\")   Wt 120.2 kg (265 lb)   SpO2 97%   BMI 34.96 kg/m    Body mass index is 34.96 kg/m .  GENERAL: healthy, alert and no distress  EYES: Eyes grossly normal to inspection  HENT:ear canals and TM's normal upon viewing with otoscope, nose and mouth without ulcers or lesions upon viewing with otoscope  NECK: no tenderness, no adenopathy, no asymmetry, no masses, no stiffness; thyroid- normal to palpation  RESP: lungs clear to auscultation - no rales, no rhonchi, no wheezes  CV: regular rates and rhythm, normal S1 S2, no S3 or S4 and no murmur, no click or rub -  ABDOMEN: soft, no tenderness, no  hepatosplenomegaly, no masses, normal bowel sounds  MS: extremities- no gross deformities noted, no edema  SKIN: no suspicious lesions, no rashes  NEURO: strength and tone- normal, sensory exam- grossly normal, mentation- intact, speech- normal, reflexes- symmetric  BACK: no CVA tenderness, no paralumbar tenderness  PSYCH: Alert and oriented times 3; speech- coherent , normal rate and volume; able to articulate logical thoughts, able to abstract reason, no " tangential thoughts, no hallucinations or delusions, affect- normal    DIAGNOSTICS/PROCEDURES:                                                      No results found for this or any previous visit (from the past 24 hour(s)).     ASSESSMENT:                                                        ICD-10-CM    1. Major depressive disorder, single episode, mild (H) F32.0 sertraline (ZOLOFT) 100 MG tablet     MENTAL HEALTH REFERRAL  - Adult; Outpatient Treatment; Individual/Couples/Family/Group Therapy/Health Psychology; Other: Not Listed - Enter Referral Details in Scheduling Comments Below   2. Anxiety F41.9 sertraline (ZOLOFT) 100 MG tablet     MENTAL HEALTH REFERRAL  - Adult; Outpatient Treatment; Individual/Couples/Family/Group Therapy/Health Psychology; Other: Not Listed - Enter Referral Details in Scheduling Comments Below   3. Class 1 obesity with serious comorbidity and body mass index (BMI) of 34.0 to 34.9 in adult, unspecified obesity type E66.9     Z68.34    4. Medication monitoring encounter Z51.81      PLAN:                                                    Discussed treatment/modality options, including risk and benefits he desires:    advised 1 multivitamin per day, advised dentist every 6 months, advised diet and exercise and advised opthalmologist every 1-2 years.     Patient had a PHQ9 score of 11 and a GAD7 score of 6 today. Patient's depression/anxiety is moderately controlled. Patient is currently prescribed 150 mg Zoloft daily for depression/anxiety management. Patient's Zoloft dosage increased to 200 mg Zoloft daily for further depression/anxiety management. Patient referred to counseling today for further depression/anxiety management.     All diagnosis above reviewed and noted above, otherwise stable.  See [facility] orders for further details.     Return in about 3 weeks (around 12/20/2019), or if symptoms worsen or fail to improve, for Medication Recheck Visit, Telephone visit.    [facility]  Maintenance Due   Topic Date Due     HPV IMMUNIZATION (1 - Male 2-dose series) 07/26/2010     HIV SCREENING  07/26/2014     PREVENTIVE CARE VISIT  07/09/2016     This document serves as a record of the services and decisions personally performed and made by Lm Bear MD. It was created on his behalf by Ernesto Jerez, a trained medical scribe. The creation of this document is based on the provider's statements to the medical scribe.  Ernesto Jerez November 29, 2019 9:43 AM     The information in this document, created by the medical scribe for me, accurately reflects the services I personally performed and the decisions made by me. I have reviewed and approved this document for accuracy prior to leaving the patient care area.  November 29, 2019            Lm Bear MD [address]  [address] [phone] Fax

## 2019-11-30 ASSESSMENT — ANXIETY QUESTIONNAIRES: GAD7 TOTAL SCORE: 6

## 2019-12-27 ENCOUNTER — VIRTUAL VISIT (OUTPATIENT)
Dept: FAMILY MEDICINE | Facility: CLINIC | Age: 19
End: 2019-12-27
Payer: COMMERCIAL

## 2019-12-27 DIAGNOSIS — Z51.81 MEDICATION MONITORING ENCOUNTER: ICD-10-CM

## 2019-12-27 DIAGNOSIS — F41.9 ANXIETY: ICD-10-CM

## 2019-12-27 DIAGNOSIS — F32.0 MAJOR DEPRESSIVE DISORDER, SINGLE EPISODE, MILD (H): Primary | ICD-10-CM

## 2019-12-27 PROCEDURE — 99441 ZZC PHYSICIAN TELEPHONE EVALUATION 5-10 MIN: CPT | Performed by: FAMILY MEDICINE

## 2019-12-27 ASSESSMENT — ANXIETY QUESTIONNAIRES
5. BEING SO RESTLESS THAT IT IS HARD TO SIT STILL: NOT AT ALL
6. BECOMING EASILY ANNOYED OR IRRITABLE: NOT AT ALL
GAD7 TOTAL SCORE: 3
1. FEELING NERVOUS, ANXIOUS, OR ON EDGE: SEVERAL DAYS
3. WORRYING TOO MUCH ABOUT DIFFERENT THINGS: SEVERAL DAYS
2. NOT BEING ABLE TO STOP OR CONTROL WORRYING: SEVERAL DAYS
IF YOU CHECKED OFF ANY PROBLEMS ON THIS QUESTIONNAIRE, HOW DIFFICULT HAVE THESE PROBLEMS MADE IT FOR YOU TO DO YOUR WORK, TAKE CARE OF THINGS AT HOME, OR GET ALONG WITH OTHER PEOPLE: NOT DIFFICULT AT ALL
7. FEELING AFRAID AS IF SOMETHING AWFUL MIGHT HAPPEN: NOT AT ALL

## 2019-12-27 ASSESSMENT — PATIENT HEALTH QUESTIONNAIRE - PHQ9
5. POOR APPETITE OR OVEREATING: NOT AT ALL
SUM OF ALL RESPONSES TO PHQ QUESTIONS 1-9: 3

## 2019-12-27 NOTE — PROGRESS NOTES
TELEPHONE VISIT:                                                      John Paul Durham is a 19 year old male who is being evaluated via a telephone visit.      S: The history as provided by the patient to the provider during this call include     On zoloft 200 mg daily, PHQ = 3 and SERGIO = 3, no med side effects, takes routinely, refills quite good, at 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 Patient's depression/anxiety is moderately controlled. Patient is currently prescribed 150 mg Zoloft daily for depression/anxiety management.    How are you doing with your depression since your last visit? Improved , but not great    How are you doing with your anxiety since your last visit?  Improved     Are you having other symptoms that might be associated with depression or anxiety? No    Have you had a significant life event? No          Do you have any concerns with your use of alcohol or other drugs? No     PHQ 10/29/2019 11/1/2019 11/29/2019   PHQ-9 Total Score 7 6 11   Q9: Thoughts of better off dead/self-harm past 2 weeks Not at all Not at all Several days   F/U: Thoughts of suicide or self-harm - - Yes   F/U: Self harm-plan - - No   F/U: Self-harm action - - No   F/U: Safety concerns - - No      SERGIO-7 SCORE 10/29/2019 11/1/2019 11/29/2019   Total Score 8 (mild anxiety) - -   Total Score 8 7 6      Suicide Assessment Five-step Evaluation and Treatment (SAFE-T)     11/1/19     On Zoloft 100 mg daily, going well until last week, increasing depression and anxiety sx - increased worry - increased sleep - feeling bad about himself - no self harm thoughts - still not as bad as it has been  - mid terms at [employer] [facility] - reviewed  "options - desires to increase zoloft, hold on counseling    Pertinent parts of the the patient's medical history reviewed and confirmed by the provider included : chart review.     ===================================================    I have reviewed the note as documented above.  This accurately captures the substance of my conversation with the patient      ASSESSMENT:                                                        ICD-10-CM    1. Major depressive disorder, single episode, mild (H) F32.0    2. Anxiety F41.9    3. Medication monitoring encounter Z51.81        PLAN:                                                      Discussed treatment/modality options, including risk and benefits, he desires:    Continue same.  Refills UTD.  Follow up 4-6 months.    All diagnosis above reviewed and noted above, otherwise stable.      See [facility] orders for further details.      Health Maintenance Due   Topic Date Due     HPV IMMUNIZATION (1 - Male 2-dose series) 07/26/2010     HIV SCREENING  07/26/2014     PREVENTIVE CARE VISIT  07/09/2016     Total time of call between patient and provider was 5-10 minutes     CONSENT:                                                      \"We have found that certain health care needs can be provided without the need for a physical exam.  This service lets us provide the care you need with a short phone conversation.  If a prescription is necessary we can send it directly to your pharmacy.  If lab work is needed we can place an order for that and you can then stop by our lab to have the test done at a later time.    This telephone visit will be conducted via 3 way call with the you (the patient) , the physician/provider, and a me all on the line at the same time.  This allows your physician/provider to have the phone conversation with you while I will be taking notes for your medical record.  We will have full access to your [facility] medical record during this entire phone call.    Since " "this is like an office visit,  will bill your insurance company for this service.  Please check with your medical insurance if this type of telephone/virtual is covered . You may be responsible for the cost of this service if insurance coverage is denied.  The typical cost is $30 (10min), $59(11-20min) and $85 (21-30min)     If during the course of the call the physician/provider feels a telephone visit is not appropriate, you will not be charged for this service\"    Consent has been obtained for this service by care team member: yes.  See the scanned image in the medical record.      The patient has been notified of following (by LYNDA Saldaña Medical Assistant            "

## 2019-12-28 ASSESSMENT — ANXIETY QUESTIONNAIRES: GAD7 TOTAL SCORE: 3

## 2020-01-14 ENCOUNTER — TELEPHONE (OUTPATIENT)
Dept: FAMILY MEDICINE | Facility: CLINIC | Age: 20
End: 2020-01-14

## 2020-01-14 DIAGNOSIS — F41.9 ANXIETY: ICD-10-CM

## 2020-01-14 DIAGNOSIS — F32.0 MAJOR DEPRESSIVE DISORDER, SINGLE EPISODE, MILD (H): ICD-10-CM

## 2020-01-14 RX ORDER — SERTRALINE HYDROCHLORIDE 100 MG/1
200 TABLET, FILM COATED ORAL DAILY
Qty: 180 TABLET | Refills: 3 | Status: SHIPPED | OUTPATIENT
Start: 2020-01-14 | End: 2020-04-15

## 2020-01-14 NOTE — TELEPHONE ENCOUNTER
Verified [facility] in [place].    Rx sent.     Dequan Sutton RN   [facility]

## 2020-01-14 NOTE — TELEPHONE ENCOUNTER
Attempt # 1  Called #   Telephone Information:   Mobile [phone]     Need to verify correct pharmacy.      Left a non detailed VM to call back at [phone] and ask for any available Triage Nurse.    Dequan Sutton RN   [facility] Triage

## 2020-01-14 NOTE — TELEPHONE ENCOUNTER
Please review with patient - [facility]'t [facility] - recent refill for 1 year, please call and renew zoloft for 1 yr

## 2020-02-10 ENCOUNTER — HEALTH MAINTENANCE LETTER (OUTPATIENT)
Age: 20
End: 2020-02-10

## 2020-04-15 DIAGNOSIS — F41.9 ANXIETY: ICD-10-CM

## 2020-04-15 DIAGNOSIS — F32.0 MAJOR DEPRESSIVE DISORDER, SINGLE EPISODE, MILD (H): ICD-10-CM

## 2020-04-15 RX ORDER — SERTRALINE HYDROCHLORIDE 100 MG/1
200 TABLET, FILM COATED ORAL DAILY
Qty: 180 TABLET | Refills: 3 | Status: SHIPPED | OUTPATIENT
Start: 2020-04-15 | End: 2020-10-14

## 2020-08-11 ENCOUNTER — HOSPITAL ENCOUNTER (EMERGENCY)
Facility: CLINIC | Age: 20
Discharge: HOME | End: 2020-08-11
Attending: EMERGENCY MEDICINE | Admitting: EMERGENCY MEDICINE
Payer: COMMERCIAL

## 2020-08-11 ENCOUNTER — NURSE TRIAGE (OUTPATIENT)
Dept: FAMILY MEDICINE | Facility: CLINIC | Age: 20
End: 2020-08-11

## 2020-08-11 VITALS
WEIGHT: 229.28 LBS | SYSTOLIC BLOOD PRESSURE: 146 MMHG | RESPIRATION RATE: 18 BRPM | DIASTOLIC BLOOD PRESSURE: 79 MMHG | TEMPERATURE: 98 F | HEIGHT: 72 IN | BODY MASS INDEX: 31.05 KG/M2 | OXYGEN SATURATION: 95 %

## 2020-08-11 DIAGNOSIS — R45.851 SUICIDAL IDEATION: ICD-10-CM

## 2020-08-11 PROCEDURE — 99285 EMERGENCY DEPT VISIT HI MDM: CPT | Mod: 25

## 2020-08-11 PROCEDURE — 90791 PSYCH DIAGNOSTIC EVALUATION: CPT

## 2020-08-11 ASSESSMENT — ENCOUNTER SYMPTOMS
SHORTNESS OF BREATH: 0
FEVER: 0
VOMITING: 0

## 2020-08-11 ASSESSMENT — MIFFLIN-ST. JEOR: SCORE: 2083

## 2020-08-11 NOTE — ED AVS SNAPSHOT
[facility] Emergency Department  [address]  [facility] [address]  Phone:  [phone]  Fax:  [phone]                                    John Paul Durham   MRN: [id number]    Department:  [facility] Emergency Department   Date of Visit:  8/11/2020           After Visit Summary Signature Page    I have received my discharge instructions, and my questions have been answered. I have discussed any challenges I see with this plan with the nurse or doctor.    ..........................................................................................................................................  Patient/Patient Representative Signature      ..........................................................................................................................................  Patient Representative Print Name and Relationship to Patient    ..................................................               ................................................  Date                                   Time    ..........................................................................................................................................  Reviewed by Signature/Title    ...................................................              ..............................................  Date                                               Time          22EPIC Rev 08/18

## 2020-08-11 NOTE — ED TRIAGE NOTES
Pt has depression for many years.  He reports it got worse recently and in past few days he has suidal thoughts.  He denies having a plan and denies any self harm prior to coming to ED.  He maintains eye contact well and answers questions easily.  He is accompanied by his father.

## 2020-08-11 NOTE — TELEPHONE ENCOUNTER
"Pt father calling in. No CTC on file.     Pt father stated Pt sent abnormal messages to parents and stated SI.  Writer Called . Spoke with Pt about how he is feeling today. Pt noted he is not doing well, work up somewhat disturbed and upset. Pt stated there is no one trigger. Pt stated this has been ongoing for few weeks, progressively getting worse.     Pt advised best treatment option today would be IP treatment. Patient stated an understanding and agreed with plan.    Father called back and confirmed he was with Pt and plan to go to ER.    Additional Information    Negative: Patient attempted suicide    Negative: Patient is threatening suicide now    Negative: Violent behavior, or threatening to physically hurt or kill someone    Negative: Patient is very confused (disoriented, slurred speech) and no other adult (e.g., friend or family member) available    Negative: Difficult to awaken or acting very confused (disoriented, slurred speech) and of new onset    Negative: Sounds like a life-threatening emergency to the triager    Negative: Depression is the main symptom and is not threatening suicide    Patient sounds very sick or weak to the triager    Answer Assessment - Initial Assessment Questions  1. CONCERN: \"What happened that made you call today?\"       Pt woke up disturbed  2. SUICIDE ATTEMPT: \"Have you tried to harm yourself recently?\"  \"Have you ever tried to harm yourself before?\"      no  3. RISK OF HARM - SUICIDAL IDEATION:  \"Do you ever have thoughts of hurting or killing yourself?\"  (e.g., yes, no, no but preoccupation with thoughts about death)    - INTENT:  \"Do you have thoughts of hurting or killing yourself right NOW?\" (e.g., yes, no, N/A)    - PLAN: \"Do you have a specific plan for how you would do this?\" (e.g., gun, knife, overdose, no plan, N/A)      Medication access  4. RISK OF HARM - HOMICIDAL IDEATION:  \"Do you ever have thoughts of hurting or killing someone else?\"  (e.g., yes, " "no, no but preoccupation with thoughts about death)    - INTENT:  \"Do you have thoughts of hurting or killing someone right NOW?\" (e.g., yes, no, N/A)    - PLAN: \"Do you have a specific plan for how you would do this?\" (e.g., gun, knife, no plan, N/A)       no  5. ACCESS: If yes to PLAN, \"Do you have access to pill?\" (e.g., pills stored in bathroom, firearm in house, knife in kitchen)      yes  6. SUPPORT: \"Who is with you now?\" \"Who do you live with?\" \"Do you have family or friends nearby who you can talk to?\"       Family with Pt but not currently  7. THERAPIST: \"Do you have a counselor or therapist? Name?\"      No, Pt first talked to someone.  8. STRESSORS: \"Has there been any new stress or recent changes in your life?\"      College going back in 2 weeks.     9. DRUG ABUSE/ALCOHOL: \"Do you drink alcohol or use any illegal drugs?\"       Drinking, last weekend  10. OTHER: \"Do you have any other health or medical symptoms right now?\" (e.g., fever)        none  11. PREGNANCY: \"Is there any chance you are pregnant?\" \"When was your last menstrual period?\"        No    Protocols used: SUICIDE [id number]    Dequan ALATORRE RN   [facility] [place] Triage        "

## 2020-08-11 NOTE — ED PROVIDER NOTES
History     Chief Complaint:  Suicidal ideation      HPI     John Paul Durham is a 20 year old male with a history of anxiety and depression who presents to the emergency department for evaluation of suicidal ideation.  Patient reports that he has struggled with depression for approximately 3 years.  He is followed by his primary care physician and has increasing doses of sertraline up to 200 mg which has been a stable dose for 3 to 4 months.  He believes that his depression has increased in severity over the last 2 weeks although there was no inciting event.  In the last 24 to 48 hours, he has the development of suicidal thoughts which has never been a problem.  He initially reported no definitive plan but father reports that he mentioned using the car to induce carbon monoxide poisoning.  He has no history of prior attempts.  There is a family history of suicide attempt in cousins but no completion.  Patient does feel safe for discharge home.  Father is accompanying the patient to the emergency department.  He denies any illicit drug or alcohol use.      Allergies:  Sulfa Drugs  Zithromax [Azithromycin Dihydrate]    Medications:    Zoloft    Past Medical History:    Anxiety  Heart murmur  Major depressive disorder  Obesity class 1    Past Surgical History:    The patient does not have any pertinent past surgical history.    Family History:    No past pertinent family history.    Social History:  Smoking Status: Never  Smokeless Tobacco: Never   Alcohol Use: Yes  Drug Use: No   Marital Status:  Single     Review of Systems   Constitutional: Negative for fever.   Respiratory: Negative for shortness of breath.    Gastrointestinal: Negative for vomiting.   Psychiatric/Behavioral: Positive for suicidal ideas.   All other systems reviewed and are negative.      Physical Exam   Vitals:  Patient Vitals for the past 24 hrs:   BP Temp Heart Rate Resp SpO2 Height Weight   08/11/20 1132 (!) 146/79 98  F (36.7  C) 81 18 95 %  1.829 m (6') 104 kg (229 lb 4.5 oz)       Physical Exam    General:   Pleasant, age appropriate.      Resting comfortably in the bed.  Eyes:    Conjunctiva normal  Neck:    Supple, no meningismus.     CV:     Regular rate and rhythm.      No murmurs, rubs or gallops.    PULM:    Clear to auscultation bilateral.       No respiratory distress.      Good air exchange.  ABD:    Soft, non-tender, non-distended.       No rebound, guarding or rigidity.  MSK:     No gross deformity to all four extremities.   LYMPH:   No cervical lymphadenopathy.  NEURO:   Alert and oriented x 3.      Speech is clear with no aphasia.     Normal muscular tone, no tremor.  Skin:    Warm, dry and intact.    Psych:    Mood is depressed, affect is appropriate and congruent.     + suicidal ideation.     No delusions, hallucinations.     Judgement is appropriate.     Memory intact.      Emergency Department Course     Emergency Department Course:  Past medical records, nursing notes, and vitals reviewed.    1141 I performed an exam of the patient as documented above. I asked DEC to see the patient.    1215 I spoke with Dec following their evaluation of the patient.     Findings and plan explained to the Patient. Patient discharged home with instructions regarding supportive care, medications, and reasons to return. The importance of close follow-up was reviewed.     Impression & Plan      Medical Decision Makin-year-old male presented to the ED with intermittent thoughts of suicidal ideation.  Patient has a vague plan but no prior attempts.  He is motivated and has future planning.  He has a good support structure with his parents.  He was evaluated by DEC who agrees that he is at low risk for suicide completion and does not require inpatient psychiatric treatment at this time.  Outpatient resources were provided to the patient.  Patient will return to the ED for any worsening symptoms otherwise to follow-up with resources  provided.    Diagnosis:    ICD-10-CM    1. Suicidal ideation  R45.851        Disposition:  discharged to home    Discharge Medications:  None    I, Germain Booth, am serving as a scribe on 8/11/2020 at 11:41 AM to personally document services performed by Rom Oden MD based on my observations and the provider's statements to me.   Germain Booth  8/11/2020   [facility] EMERGENCY DEPARTMENT       Rom Oden MD  08/11/20 1352

## 2020-08-13 ENCOUNTER — MEDICAL CORRESPONDENCE (OUTPATIENT)
Dept: HEALTH INFORMATION MANAGEMENT | Facility: CLINIC | Age: 20
End: 2020-08-13

## 2020-10-12 ENCOUNTER — MYC MEDICAL ADVICE (OUTPATIENT)
Dept: FAMILY MEDICINE | Facility: CLINIC | Age: 20
End: 2020-10-12

## 2020-10-12 DIAGNOSIS — F41.9 ANXIETY: ICD-10-CM

## 2020-10-12 DIAGNOSIS — F32.0 MAJOR DEPRESSIVE DISORDER, SINGLE EPISODE, MILD (H): ICD-10-CM

## 2020-10-13 NOTE — TELEPHONE ENCOUNTER
PHQ 11/29/2019 12/27/2019 10/13/2020   PHQ-9 Total Score 11 3 3   Q9: Thoughts of better off dead/self-harm past 2 weeks Several days Not at all Not at all   F/U: Thoughts of suicide or self-harm Yes No -   F/U: Self harm-plan No No -   F/U: Self-harm action No No -   F/U: Safety concerns No No -     SERGIO-7 SCORE 11/29/2019 12/27/2019 10/13/2020   Total Score - - 1 (minimal anxiety)   Total Score 6 3 1       Please advise on refill we have not filled this for him before - he did verify med in my chart below     Thank you     Yina Cooper RN, BSN  [place] [facility]

## 2020-10-14 ENCOUNTER — MYC MEDICAL ADVICE (OUTPATIENT)
Dept: FAMILY MEDICINE | Facility: CLINIC | Age: 20
End: 2020-10-14

## 2020-10-14 DIAGNOSIS — F41.9 ANXIETY: Primary | ICD-10-CM

## 2020-10-14 RX ORDER — SERTRALINE HYDROCHLORIDE 100 MG/1
200 TABLET, FILM COATED ORAL DAILY
Qty: 60 TABLET | Refills: 0 | Status: SHIPPED | OUTPATIENT
Start: 2020-10-14 | End: 2020-10-15 | Stop reason: ALTCHOICE

## 2020-10-14 NOTE — TELEPHONE ENCOUNTER
Nicky given, will need OV/VV prior to refills.    Dequan ALATORRE RN   [facility]

## 2020-10-15 ENCOUNTER — TELEPHONE (OUTPATIENT)
Dept: FAMILY MEDICINE | Facility: CLINIC | Age: 20
End: 2020-10-15

## 2020-10-15 DIAGNOSIS — F41.9 ANXIETY: Primary | ICD-10-CM

## 2020-10-15 RX ORDER — VENLAFAXINE HYDROCHLORIDE 75 MG/1
75 TABLET, EXTENDED RELEASE ORAL DAILY
Qty: 30 TABLET | Refills: 0 | Status: SHIPPED | OUTPATIENT
Start: 2020-10-15 | End: 2020-10-15

## 2020-10-15 RX ORDER — VENLAFAXINE HYDROCHLORIDE 75 MG/1
75 CAPSULE, EXTENDED RELEASE ORAL DAILY
Qty: 30 CAPSULE | Refills: 0 | Status: SHIPPED | OUTPATIENT
Start: 2020-10-15 | End: 2020-10-19

## 2020-10-15 NOTE — TELEPHONE ENCOUNTER
"Pharmacy comments:    \"Alternative to venlafaxine (EFFEXOR-ER) 75 MG 24 hr tablet requested. Please resend as capsules. Insurance does not cover tablets. Thank you.\"  "

## 2020-10-15 NOTE — TELEPHONE ENCOUNTER
A prescription was sent on 10/14/2024 sertraline 100 mg tabs 2 tabs daily; he is requesting venlafaxine.  Is he most likely no longer on sertraline then?  If he is off sertraline and would like the venlafaxine symptom that is fine for a one-month supply and then a video visit with Dr. Bear is recommended    Last visit in this dept:    12/27/2019     Next visit in this dept:       Health Maintenance   Topic Date Due     HPV IMMUNIZATION (1 - Male 2-dose series) 07/26/2010     HIV SCREENING  07/26/2014     PREVENTIVE CARE VISIT  07/09/2016     INFLUENZA VACCINE (1) 09/01/2020     PHQ-9  04/13/2021     DTAP/TDAP/TD IMMUNIZATION (7 - Td) 07/05/2021     DEPRESSION ACTION PLAN  Completed     IPV IMMUNIZATION  Completed     HEPATITIS B IMMUNIZATION  Completed     Pneumococcal Vaccine: Pediatrics (0 to 5 Years) and At-Risk Patients (6 to 64 Years)  Aged Out     MENINGITIS IMMUNIZATION  Aged Out

## 2020-10-15 NOTE — TELEPHONE ENCOUNTER
Chalino Reyna MD    11:03 PM  Note     A prescription was sent on 10/14/2024 sertraline 100 mg tabs 2 tabs daily; he is requesting venlafaxine.  Is he most likely no longer on sertraline then?  If he is off sertraline and would like the venlafaxine symptom that is fine for a one-month supply and then a video visit with Dr. Bear is recommended     Last visit in this dept:    12/27/2019      Next visit in this dept:               Health Maintenance   Topic Date Due     HPV IMMUNIZATION (1 - Male 2-dose series) 07/26/2010     HIV SCREENING  07/26/2014     PREVENTIVE CARE VISIT  07/09/2016     INFLUENZA VACCINE (1) 09/01/2020     PHQ-9  04/13/2021     DTAP/TDAP/TD IMMUNIZATION (7 - Td) 07/05/2021     DEPRESSION ACTION PLAN  Completed     IPV IMMUNIZATION  Completed     HEPATITIS B IMMUNIZATION  Completed     Pneumococcal Vaccine: Pediatrics (0 to 5 Years) and At-Risk Patients (6 to 64 Years)  Aged Out     MENINGITIS IMMUNIZATION  Aged Out           Mychart sent to advise follow up    Dequan ALATORRE RN   [facility] [place]

## 2020-10-16 ENCOUNTER — MYC MEDICAL ADVICE (OUTPATIENT)
Dept: FAMILY MEDICINE | Facility: CLINIC | Age: 20
End: 2020-10-16

## 2020-10-16 DIAGNOSIS — F41.9 ANXIETY: ICD-10-CM

## 2020-10-16 NOTE — TELEPHONE ENCOUNTER
See previous MyChart Messages  Only a 30 day supply was sent in as the venlafaxine was originally prescribed by psychiatry.     Routing to PCP for further review/recommendations/orders.  OK for 90 day supply?      Maria Vail RN  [facility]

## 2020-10-19 RX ORDER — VENLAFAXINE HYDROCHLORIDE 75 MG/1
75 CAPSULE, EXTENDED RELEASE ORAL DAILY
Qty: 30 CAPSULE | Refills: 0 | Status: SHIPPED | OUTPATIENT
Start: 2020-10-19 | End: 2020-10-21

## 2020-10-19 NOTE — TELEPHONE ENCOUNTER
Alternative requested from [facility] pharmacy, [place] - insurance doesn't cover tablets - would like new RX for capsules

## 2020-10-19 NOTE — TELEPHONE ENCOUNTER
Called pharmacy and they said it has to be capsules and it must be a 90 day supply for any local pharmacy.     RN advised that this cannot be given a 90 days due to pt needs to f/u     Yina Cooper RN, BSN  [facility]

## 2020-10-21 RX ORDER — VENLAFAXINE HYDROCHLORIDE 75 MG/1
75 CAPSULE, EXTENDED RELEASE ORAL DAILY
Qty: 90 CAPSULE | Refills: 0 | Status: SHIPPED | OUTPATIENT
Start: 2020-10-21 | End: 2020-10-26

## 2020-10-21 NOTE — TELEPHONE ENCOUNTER
pts father asking if he can get the 90 day refill because insurance will not pay for it and the pharmacy will not give the pt the 30 days supply even if he honorio cash , dad said pt is doing very well on the med and he does not want him to go without for 5 days     RN did fill for the 90 days and dad promised he will make sure pt is ready for his appointment on monday     Yina Cooper RN, BSN  [facility]

## 2020-10-26 ENCOUNTER — VIRTUAL VISIT (OUTPATIENT)
Dept: FAMILY MEDICINE | Facility: CLINIC | Age: 20
End: 2020-10-26
Payer: COMMERCIAL

## 2020-10-26 DIAGNOSIS — Z51.81 MEDICATION MONITORING ENCOUNTER: ICD-10-CM

## 2020-10-26 DIAGNOSIS — F41.9 ANXIETY: ICD-10-CM

## 2020-10-26 DIAGNOSIS — F32.0 MAJOR DEPRESSIVE DISORDER, SINGLE EPISODE, MILD (H): Primary | ICD-10-CM

## 2020-10-26 PROCEDURE — 99214 OFFICE O/P EST MOD 30 MIN: CPT | Mod: 95 | Performed by: FAMILY MEDICINE

## 2020-10-26 RX ORDER — VENLAFAXINE HYDROCHLORIDE 75 MG/1
75 CAPSULE, EXTENDED RELEASE ORAL DAILY
Qty: 90 CAPSULE | Refills: 1 | Status: SHIPPED | OUTPATIENT
Start: 2020-10-26 | End: 2020-11-10

## 2020-10-26 NOTE — PROGRESS NOTES
[facility]    Telephone visit  - [phone]    Subjective    John Paul Durham is a 20 year old male who is being evaluated via a billable telephone visit.      Chief Complaint   Patient presents with     Recheck Medication       Medication Followup of Venlafaxine 75 mg daily - last seen 12/2019      Taking Medication as prescribed: yes    Side Effects:  None    Medication Helping Symptoms:  Yes, really    PHQ 11/29/2019 12/27/2019 10/13/2020   PHQ-9 Total Score 11 3 3   Q9: Thoughts of better off dead/self-harm past 2 weeks Several days Not at all Not at all   F/U: Thoughts of suicide or self-harm Yes No -   F/U: Self harm-plan No No -   F/U: Self-harm action No No -   F/U: Safety concerns No No -     SERGIO-7 SCORE 11/29/2019 12/27/2019 10/13/2020   Total Score - - 1 (minimal anxiety)   Total Score 6 3 1     No concerns, feeling well, at [employer], school going well, need refills, biology major, 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 Maternal Grandfather      Brain Cancer Paternal Grandfather         Skin cancer origin?     Depression Maternal Grandmother         as well as other maternal side relatives       Social History    Social History     Socioeconomic History     Marital status: Single     Spouse name: Not on file     Number of children: 0     Years of education: Not on file     Highest education level: Not on file   Occupational  History     Occupation: [employer]     Employer: STUDENT     Comment: Biology   Social Needs     Financial resource strain: Not on file     Food insecurity     Worry: Not on file     Inability: Not on file     Transportation needs     Medical: Not on file     Non-medical: Not on file   Tobacco Use     Smoking status: Never Smoker     Smokeless tobacco: Never Used   Substance and Sexual Activity     Alcohol use: Yes     Alcohol/week: 0.0 standard drinks     Comment: 1 time per month     Drug use: No     Sexual activity: Yes   Lifestyle     Physical activity     Days per week: Not on file     Minutes per session: Not on file     Stress: Not on file   Relationships     Social connections     Talks on phone: Not on file     Gets together: Not on file     Attends Jain service: Not on file     Active member of club or organization: Not on file     Attends meetings of clubs or organizations: Not on file     Relationship status: Not on file     Intimate partner violence     Fear of current or ex partner: Not on file     Emotionally abused: Not on file     Physically abused: Not on file     Forced sexual activity: Not on file   Other Topics Concern      Service Not Asked     Blood Transfusions Not Asked     Caffeine Concern Yes     Comment: rare     Occupational Exposure Not Asked     Hobby Hazards Not Asked     Sleep Concern Not Asked     Stress Concern Not Asked     Weight Concern Not Asked     Special Diet Not Asked     Back Care Not Asked     Exercise Yes     Comment: FB -2 hrs > 4 times per week     Bike Helmet Yes     Seat Belt Yes     Self-Exams Not Asked   Social History Narrative     Not on file       Reviewed and updated as needed this visit by Provider                   Review of Systems     CONSTITUTIONAL: NEGATIVE for fever, chills, change in weight  INTEGUMENTARY/SKIN: NEGATIVE for worrisome rashes, moles or lesions  EYES: NEGATIVE for vision changes or irritation  ENT/MOUTH: NEGATIVE for  "ear, mouth and throat problems  RESP: NEGATIVE for significant cough or SOB  CV: NEGATIVE for chest pain, palpitations or peripheral edema  GI: NEGATIVE for nausea, abdominal pain, heartburn, or change in bowel habits  : NEGATIVE for frequency, dysuria, or hematuria  MUSCULOSKELETAL: NEGATIVE for significant arthralgias or myalgia  NEURO: NEGATIVE for weakness, dizziness or paresthesias  ENDOCRINE: NEGATIVE for temperature intolerance, skin/hair changes  HEME: NEGATIVE for bleeding problems  PSYCHIATRIC: NEGATIVE for changes in mood or affect    Objective    Reported vitals:  There were no vitals taken for this visit.     healthy, alert and no distress  Psych: Alert and oriented times 3; coherent speech, normal   rate and volume, able to articulate logical thoughts, able   to abstract reason, no tangential thoughts, no hallucinations   or delusions  His affect is normal     Diagnostic Test Results:  Labs reviewed in Epic    Assessment/Plan:      ICD-10-CM    1. Major depressive disorder, single episode, mild (H)  F32.0    2. Anxiety  F41.9 venlafaxine (EFFEXOR-XR) 75 MG 24 hr capsule   3. Medication monitoring encounter  Z51.81        Med refills  Follow up in 3-6 months    Return in about 3 months (around 1/26/2021), or if symptoms worsen or fail to improve, for Medication Recheck Visit, Follow Up Chronic.    Phone call duration:  12 minutes    The patient has been notified of following:     \"This telephone visit will be conducted via a call between you and your physician/provider. We have found that certain health care needs can be provided without the need for a physical exam.  This service lets us provide the care you need with a short phone conversation.  If a prescription is necessary we can send it directly to your pharmacy.  If lab work is needed we can place an order for that and you can then stop by our lab to have the test done at a later time.    Telephone visits are billed at different rates depending " "on your insurance coverage. During this emergency period, for some insurers they may be billed the same as an in-person visit.  Please reach out to your insurance provider with any questions.    If during the course of the call the physician/provider feels a telephone visit is not appropriate, you will not be charged for this service.\"    Patient has given verbal consent for Telephone visit?  Yes    How would you like to obtain your AVS? Citlali Bear MD, FAAFP     [facility]  [address] [place] [id number]  [email or web address]   Office: [phone]  Fax: [phone]  Pager: [phone]     "

## 2020-11-09 ENCOUNTER — MYC MEDICAL ADVICE (OUTPATIENT)
Dept: FAMILY MEDICINE | Facility: CLINIC | Age: 20
End: 2020-11-09

## 2020-11-09 DIAGNOSIS — F41.9 ANXIETY: ICD-10-CM

## 2020-11-10 RX ORDER — VENLAFAXINE HYDROCHLORIDE 75 MG/1
150 CAPSULE, EXTENDED RELEASE ORAL DAILY
Qty: 180 CAPSULE | Refills: 3 | Status: SHIPPED | OUTPATIENT
Start: 2020-11-10 | End: 2020-11-25

## 2020-11-10 NOTE — TELEPHONE ENCOUNTER
Citlali sent  Awaiting response      Maira Vail RN  [facility]   [facility] [address] [address] [facility], [place] [address]  Phone: [phone]  Fax: [phone]  [facility] [facility]: [phone]    Pt Name: Ann Marie Neal Date: 11/03/2019   Employee ID:  Date of Treatment: 12/27/2019   Company: [employer]      Appointment Time: 08:45 AM Arrived: 08:55 AM   Provider: Ayad Nicole MD Time Out:10:30  AM     Office Treatment:   EXAM  RX  PT  DISABLED TIL NEXT VISIT    1. Strain of neck muscle, subsequent encounter    2. Myofascial muscle pain    3. Neck pain, bilateral    4. Strain of right shoulder, subsequent encounter    5. Strain of neck muscle, initial encounter      Medications Ordered This Encounter   Medications    acetaminophen-codeine 300-60mg (TYLENOL #4) 300-60 mg Tab    cyclobenzaprine (FLEXERIL) 5 MG tablet      Patient Instructions: Continue Physical Therapy, Daily home exercises/warm soaks      Restrictions: Disabled until next office visit(Patient is disabled until the next office visit in 1 week. )     Return Appointment: 1/3/2020 at 13:00  PM

## 2020-11-10 NOTE — TELEPHONE ENCOUNTER
PHQ 12/27/2019 10/13/2020 11/10/2020   PHQ-9 Total Score 3 3 8   Q9: Thoughts of better off dead/self-harm past 2 weeks Not at all Not at all Not at all   F/U: Thoughts of suicide or self-harm No - -   F/U: Self harm-plan No - -   F/U: Self-harm action No - -   F/U: Safety concerns No - -     SERGIO-7 SCORE 12/27/2019 10/13/2020 11/10/2020   Total Score - 1 (minimal anxiety) 3 (minimal anxiety)   Total Score 3 1 3       Routing to PCP for further review/recommendations/orders.  Please see Citlali and advise      Maria Vail RN  [facility]  [place]

## 2020-11-23 ENCOUNTER — VIRTUAL VISIT (OUTPATIENT)
Dept: FAMILY MEDICINE | Facility: CLINIC | Age: 20
End: 2020-11-23
Payer: COMMERCIAL

## 2020-11-23 DIAGNOSIS — F32.0 MAJOR DEPRESSIVE DISORDER, SINGLE EPISODE, MILD (H): Primary | ICD-10-CM

## 2020-11-23 DIAGNOSIS — Z51.81 MEDICATION MONITORING ENCOUNTER: ICD-10-CM

## 2020-11-23 DIAGNOSIS — F41.9 ANXIETY: ICD-10-CM

## 2020-11-23 PROCEDURE — 99214 OFFICE O/P EST MOD 30 MIN: CPT | Mod: 95 | Performed by: FAMILY MEDICINE

## 2020-11-23 ASSESSMENT — ANXIETY QUESTIONNAIRES
7. FEELING AFRAID AS IF SOMETHING AWFUL MIGHT HAPPEN: NOT AT ALL
5. BEING SO RESTLESS THAT IT IS HARD TO SIT STILL: NOT AT ALL
GAD7 TOTAL SCORE: 1
1. FEELING NERVOUS, ANXIOUS, OR ON EDGE: SEVERAL DAYS
3. WORRYING TOO MUCH ABOUT DIFFERENT THINGS: NOT AT ALL
IF YOU CHECKED OFF ANY PROBLEMS ON THIS QUESTIONNAIRE, HOW DIFFICULT HAVE THESE PROBLEMS MADE IT FOR YOU TO DO YOUR WORK, TAKE CARE OF THINGS AT HOME, OR GET ALONG WITH OTHER PEOPLE: SOMEWHAT DIFFICULT
2. NOT BEING ABLE TO STOP OR CONTROL WORRYING: NOT AT ALL
6. BECOMING EASILY ANNOYED OR IRRITABLE: NOT AT ALL

## 2020-11-23 ASSESSMENT — PATIENT HEALTH QUESTIONNAIRE - PHQ9
SUM OF ALL RESPONSES TO PHQ QUESTIONS 1-9: 4
5. POOR APPETITE OR OVEREATING: NOT AT ALL

## 2020-11-23 NOTE — PROGRESS NOTES
[facility] [place]    Video visit  - [phone]    Subjective    John Paul Durham is a 20 year old male who is being evaluated via a billable video visit.      Patient would like the video invitation sent by: Text to cell phone: [phone]    Chief Complaint   Patient presents with     Depression     Depression Followup    How are you doing with your depression since your last visit? No change    Are you having other symptoms that might be associated with depression? No    Have you had a significant life event?  No     Are you feeling anxious or having panic attacks?   No    Do you have any concerns with your use of alcohol or other drugs? No     Notes at D, school going well, effexor XR healing well, no side effects, phq and marcellus improved, desires to continue same    Social History     Tobacco Use     Smoking status: Never Smoker     Smokeless tobacco: Never Used   Substance Use Topics     Alcohol use: Yes     Alcohol/week: 0.0 standard drinks     Comment: 1-2 per week     Drug use: No     PHQ 10/13/2020 11/10/2020 11/23/2020   PHQ-9 Total Score 3 8 4   Q9: Thoughts of better off dead/self-harm past 2 weeks Not at all Not at all Not at all   F/U: Thoughts of suicide or self-harm - - -   F/U: Self harm-plan - - -   F/U: Self-harm action - - -   F/U: Safety concerns - - -     MARCELLUS-7 SCORE 10/13/2020 11/10/2020 11/23/2020   Total Score 1 (minimal anxiety) 3 (minimal anxiety) -   Total Score 1 3 1         Suicide Assessment Five-step Evaluation and Treatment (SAFE-T)    Reviewed and updated as needed this visit by Provider                   [facility]    Past Medical History:   Diagnosis Date     Anxiety      heart murmur congenital    asymptomatic     Major depressive disorder, single episode, mild (H)        PSH    Past Surgical History:   Procedure Laterality Date     NO HISTORY OF SURGERY         Medications    Current Outpatient Medications   Medication Sig Dispense Refill     venlafaxine (EFFEXOR-XR) 75  MG 24 hr capsule Take 2 capsules (150 mg) by mouth daily 180 capsule 3       Allergies    Sulfa drugs and Zithromax [azithromycin dihydrate]    Family History    Family History   Problem Relation Age of Onset     C.A.D. Maternal Grandfather      Brain Cancer Paternal Grandfather         Skin cancer origin?     Depression Maternal Grandmother         as well as other maternal side relatives       Social History    Social History     Socioeconomic History     Marital status: Single     Spouse name: Not on file     Number of children: 0     Years of education: Not on file     Highest education level: Not on file   Occupational History     Occupation: [employer]     Employer: STUDENT     Comment: Biology   Social Needs     Financial resource strain: Not on file     Food insecurity     Worry: Not on file     Inability: Not on file     Transportation needs     Medical: Not on file     Non-medical: Not on file   Tobacco Use     Smoking status: Never Smoker     Smokeless tobacco: Never Used   Substance and Sexual Activity     Alcohol use: Yes     Alcohol/week: 0.0 standard drinks     Comment: 3-4 per month     Drug use: No     Sexual activity: Yes   Lifestyle     Physical activity     Days per week: Not on file     Minutes per session: Not on file     Stress: Not on file   Relationships     Social connections     Talks on phone: Not on file     Gets together: Not on file     Attends Jewish service: Not on file     Active member of club or organization: Not on file     Attends meetings of clubs or organizations: Not on file     Relationship status: Not on file     Intimate partner violence     Fear of current or ex partner: Not on file     Emotionally abused: Not on file     Physically abused: Not on file     Forced sexual activity: Not on file   Other Topics Concern      Service Not Asked     Blood Transfusions Not Asked     Caffeine Concern Yes     Comment: rare     Occupational Exposure Not Asked      Hobby Hazards Not Asked     Sleep Concern Not Asked     Stress Concern Not Asked     Weight Concern Not Asked     Special Diet Not Asked     Back Care Not Asked     Exercise Yes     Comment: FB -2 hrs > 4 times per week     Bike Helmet Yes     Seat Belt Yes     Self-Exams Not Asked   Social History Narrative     Not on file       Reviewed and updated as needed this visit by Provider                   Review of Systems     CONSTITUTIONAL: NEGATIVE for fever, chills, change in weight  INTEGUMENTARY/SKIN: NEGATIVE for worrisome rashes, moles or lesions  EYES: NEGATIVE for vision changes or irritation  ENT/MOUTH: NEGATIVE for ear, mouth and throat problems  RESP: NEGATIVE for significant cough or SOB  CV: NEGATIVE for chest pain, palpitations or peripheral edema  GI: NEGATIVE for nausea, abdominal pain, heartburn, or change in bowel habits  : NEGATIVE for frequency, dysuria, or hematuria  MUSCULOSKELETAL: NEGATIVE for significant arthralgias or myalgia  NEURO: NEGATIVE for weakness, dizziness or paresthesias  ENDOCRINE: NEGATIVE for temperature intolerance, skin/hair changes  HEME: NEGATIVE for bleeding problems  PSYCHIATRIC: NEGATIVE for changes in mood or affect    Objective    Reported vitals:  There were no vitals taken for this visit.     healthy, alert and no distress  Psych: Alert and oriented times 3; coherent speech, normal   rate and volume, able to articulate logical thoughts, able   to abstract reason, no tangential thoughts, no hallucinations   or delusions  His affect is normal     GENERAL: Healthy, alert and no distress  EYES: Eyes grossly normal to inspection.  No discharge or erythema, or obvious scleral/conjunctival abnormalities.  RESP: No audible wheeze, cough, or visible cyanosis.  No visible retractions or increased work of breathing.    SKIN: Visible skin clear. No significant rash, abnormal pigmentation or lesions.  NEURO: Cranial nerves grossly intact.  Mentation and speech appropriate for  "age.  PSYCH: Mentation appears normal, affect normal/bright, judgement and insight intact, normal speech and appearance well-groomed.    Diagnostic Test Results:  Labs reviewed in Epic    Assessment/Plan:      ICD-10-CM    1. Major depressive disorder, single episode, mild (H)  F32.0    2. Anxiety  F41.9    3. Medication monitoring encounter  Z51.81        Continue effexor  Refill done    Return in about 4 months (around 3/23/2021), or if symptoms worsen or fail to improve, for Medication Recheck Visit, Follow Up Chronic.    Video-Visit Details    Type of service:  Video Visit    Video Start Time: 6:12 PM  Video End Time (time video stopped): 6:25 pm    Originating Location (pt. Location): Home    Distant Location (provider location):  [facility]     Mode of Communication:  Video Conference via Virtual View App    The patient has been notified of following:     \"This video visit will be conducted via a call between you and your physician/provider. We have found that certain health care needs can be provided without the need for an in-person physical exam.  This service lets us provide the care you need with a video conversation.  If a prescription is necessary we can send it directly to your pharmacy.  If lab work is needed we can place an order for that and you can then stop by our lab to have the test done at a later time.    If during the course of the call the physician/provider feels a video visit is not appropriate, you will not be charged for this service.\"     Patient has given verbal consent for Video visit? Yes             Lm Bear MD, FAAFP     [facility]  [address] [place] [id number]  [email or web address]   Office: [phone]  Fax: [phone]  Pager: [phone]     "

## 2020-11-24 ASSESSMENT — ANXIETY QUESTIONNAIRES: GAD7 TOTAL SCORE: 1

## 2020-11-25 RX ORDER — VENLAFAXINE HYDROCHLORIDE 150 MG/1
150 CAPSULE, EXTENDED RELEASE ORAL DAILY
Qty: 90 CAPSULE | Refills: 3 | Status: SHIPPED | OUTPATIENT
Start: 2020-11-25 | End: 2021-12-13

## 2021-03-14 ENCOUNTER — MYC MEDICAL ADVICE (OUTPATIENT)
Dept: FAMILY MEDICINE | Facility: CLINIC | Age: 21
End: 2021-03-14

## 2021-03-15 ENCOUNTER — MYC MEDICAL ADVICE (OUTPATIENT)
Dept: FAMILY MEDICINE | Facility: CLINIC | Age: 21
End: 2021-03-15

## 2021-03-15 NOTE — TELEPHONE ENCOUNTER
LOV 11/23/2020     My chart message sent - pt needs to be seen for this - please help pt schedule an appointment - can be VV     Thank you     Yina Cooper RN, BSN  [facility]

## 2021-03-15 NOTE — TELEPHONE ENCOUNTER
Please see message below     Please help pt schedule sooner if possible     Thank you     Yina Cooper RN, BSN  [facility]

## 2021-03-22 ENCOUNTER — VIRTUAL VISIT (OUTPATIENT)
Dept: FAMILY MEDICINE | Facility: CLINIC | Age: 21
End: 2021-03-22
Payer: COMMERCIAL

## 2021-03-22 DIAGNOSIS — F32.0 MAJOR DEPRESSIVE DISORDER, SINGLE EPISODE, MILD (H): ICD-10-CM

## 2021-03-22 DIAGNOSIS — M26.622 LEFT-SIDED TEMPOROMANDIBULAR JOINT PAIN-DYSFUNCTION SYNDROME: ICD-10-CM

## 2021-03-22 DIAGNOSIS — Z51.81 MEDICATION MONITORING ENCOUNTER: ICD-10-CM

## 2021-03-22 DIAGNOSIS — F41.9 ANXIETY: Primary | ICD-10-CM

## 2021-03-22 PROCEDURE — 96127 BRIEF EMOTIONAL/BEHAV ASSMT: CPT | Mod: 95 | Performed by: FAMILY MEDICINE

## 2021-03-22 PROCEDURE — 99214 OFFICE O/P EST MOD 30 MIN: CPT | Mod: 95 | Performed by: FAMILY MEDICINE

## 2021-03-22 RX ORDER — VENLAFAXINE HYDROCHLORIDE 75 MG/1
75 CAPSULE, EXTENDED RELEASE ORAL DAILY
Qty: 90 CAPSULE | Refills: 1 | Status: SHIPPED | OUTPATIENT
Start: 2021-03-22 | End: 2022-04-26

## 2021-03-22 ASSESSMENT — ANXIETY QUESTIONNAIRES
3. WORRYING TOO MUCH ABOUT DIFFERENT THINGS: MORE THAN HALF THE DAYS
GAD7 TOTAL SCORE: 10
IF YOU CHECKED OFF ANY PROBLEMS ON THIS QUESTIONNAIRE, HOW DIFFICULT HAVE THESE PROBLEMS MADE IT FOR YOU TO DO YOUR WORK, TAKE CARE OF THINGS AT HOME, OR GET ALONG WITH OTHER PEOPLE: SOMEWHAT DIFFICULT
7. FEELING AFRAID AS IF SOMETHING AWFUL MIGHT HAPPEN: SEVERAL DAYS
5. BEING SO RESTLESS THAT IT IS HARD TO SIT STILL: NOT AT ALL
1. FEELING NERVOUS, ANXIOUS, OR ON EDGE: MORE THAN HALF THE DAYS
2. NOT BEING ABLE TO STOP OR CONTROL WORRYING: MORE THAN HALF THE DAYS
6. BECOMING EASILY ANNOYED OR IRRITABLE: MORE THAN HALF THE DAYS

## 2021-03-22 ASSESSMENT — PATIENT HEALTH QUESTIONNAIRE - PHQ9
5. POOR APPETITE OR OVEREATING: SEVERAL DAYS
SUM OF ALL RESPONSES TO PHQ QUESTIONS 1-9: 5

## 2021-03-22 NOTE — PROGRESS NOTES
[facility] [place]    Telephone visit  - [phone]    Subjective    John Paul Durham is a 20 year old male who is being evaluated via a billable telephone visit.      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 Improved     How are you doing with your anxiety since your last visit?  Worsened     Are you having other symptoms that might be associated with depression or anxiety? Yes:  Panic attacts , worry    Have you had a significant life event? OTHER: School     Do you have any concerns with your use of alcohol or other drugs? No    Social History     Tobacco Use     Smoking status: Never Smoker     Smokeless tobacco: Never Used   Substance Use Topics     Alcohol use: Yes     Alcohol/week: 0.0 standard drinks     Comment: 1-2 per week     Drug use: No     PHQ 11/10/2020 11/23/2020 3/22/2021   PHQ-9 Total Score 8 4 5   Q9: Thoughts of better off dead/self-harm past 2 weeks Not at all Not at all Not at all   F/U: Thoughts of suicide or self-harm - - -   F/U: Self harm-plan - - -   F/U: Self-harm action - - -   F/U: Safety concerns - - -     SERGIO-7 SCORE 11/10/2020 11/23/2020 3/22/2021   Total Score 3 (minimal anxiety) - -   Total Score 3 1 10     Last PHQ-9 3/22/2021   1.  Little interest or pleasure in doing things 1   2.  Feeling down, depressed, or hopeless 0   3.  Trouble falling or staying asleep, or sleeping too much 1   4.  Feeling tired or having little energy 1   5.  Poor appetite or overeating 0   6.  Feeling bad about yourself 2   7.  Trouble concentrating 0   8.  Moving slowly or restless 0   Q9:  Thoughts of better off dead/self-harm past 2 weeks 0   PHQ-9 Total Score 5   Difficulty at work, home, or with people Somewhat difficult   In the past two weeks have you had thoughts of suicide or self harm? -   Do you have concerns about your personal safety or the safety of others? -   In the past 2 weeks have you thought about a plan or had intention to harm yourself? -   In the past 2 weeks have you acted on these thoughts in any way? -     SERGIO-7  3/22/2021   1. Feeling nervous, anxious, or on edge 2   2. Not being able to stop or control worrying 2   3. Worrying too much about different things 2   4. Trouble relaxing 1   5. Being so restless that it is hard to sit still 0   6. Becoming easily annoyed or irritable 2   7. Feeling afraid, as if something awful might happen 1   SERGIO-7 Total Score 10   If you checked any problems, how difficult have they made it for you to do your work, take care of things at home, or get along with other people? Somewhat difficult       Suicide Assessment Five-step Evaluation and Treatment (SAFE-T)      How many servings of fruits and vegetables do you eat daily?  2-3    On average, how many sweetened beverages do you drink each day (Examples: soda, juice, sweet tea, etc.  Do NOT count diet or artificially sweetened beverages)?   1    How many days per week do you exercise enough to make your heart beat faster? 4    How many minutes a day do you exercise enough to make your heart beat faster? 20 - 29    How many days per week do you miss taking your medication? 0      PMH    Past Medical History:   Diagnosis Date     Anxiety      heart murmur congenital    asymptomatic     Major depressive disorder, single episode, mild (H)        PSH    Past Surgical History:   Procedure Laterality Date     NO HISTORY OF SURGERY         Medications    Current Outpatient Medications   Medication Sig Dispense Refill     venlafaxine (EFFEXOR XR) 75 MG 24 hr capsule Take 1 capsule (75 mg) by mouth  daily 90 capsule 1     venlafaxine (EFFEXOR-XR) 150 MG 24 hr capsule Take 1 capsule (150 mg) by mouth daily 90 capsule 3       Allergies    Sulfa drugs and Zithromax [azithromycin dihydrate]    Family History    Family History   Problem Relation Age of Onset     C.A.D. Maternal Grandfather      Brain Cancer Paternal Grandfather         Skin cancer origin?     Depression Maternal Grandmother         as well as other maternal side relatives       Social History    Social History     Socioeconomic History     Marital status: Single     Spouse name: Not on file     Number of children: 0     Years of education: Not on file     Highest education level: Not on file   Occupational History     Occupation: "[employer]     Employer: STUDENT     Comment: Biology   Social Needs     Financial resource strain: Not on file     Food insecurity     Worry: Not on file     Inability: Not on file     Transportation needs     Medical: Not on file     Non-medical: Not on file   Tobacco Use     Smoking status: Never Smoker     Smokeless tobacco: Never Used   Substance and Sexual Activity     Alcohol use: Yes     Alcohol/week: 0.0 standard drinks     Comment: 1-2 per week     Drug use: No     Sexual activity: Yes   Lifestyle     Physical activity     Days per week: Not on file     Minutes per session: Not on file     Stress: Not on file   Relationships     Social connections     Talks on phone: Not on file     Gets together: Not on file     Attends Taoism service: Not on file     Active member of club or organization: Not on file     Attends meetings of clubs or organizations: Not on file     Relationship status: Not on file     Intimate partner violence     Fear of current or ex partner: Not on file     Emotionally abused: Not on file     Physically abused: Not on file     Forced sexual activity: Not on file   Other Topics Concern      Service Not Asked     Blood Transfusions Not Asked     Caffeine Concern Yes     Comment:  rare     Occupational Exposure Not Asked     Hobby Hazards Not Asked     Sleep Concern Not Asked     Stress Concern Not Asked     Weight Concern Not Asked     Special Diet Not Asked     Back Care Not Asked     Exercise Yes     Comment: FB -2 hrs > 4 times per week     Bike Helmet Yes     Seat Belt Yes     Self-Exams Not Asked   Social History Narrative     Not on file       Reviewed and updated as needed this visit by Provider                   Review of Systems     CONSTITUTIONAL: NEGATIVE for fever, chills, change in weight  INTEGUMENTARY/SKIN: NEGATIVE for worrisome rashes, moles or lesions  EYES: NEGATIVE for vision changes or irritation  ENT/MOUTH: NEGATIVE for ear, mouth and throat problems  RESP: NEGATIVE for significant cough or SOB  CV: NEGATIVE for chest pain, palpitations or peripheral edema  GI: NEGATIVE for nausea, abdominal pain, heartburn, or change in bowel habits  : NEGATIVE for frequency, dysuria, or hematuria  MUSCULOSKELETAL: NEGATIVE for significant arthralgias or myalgia  NEURO: NEGATIVE for weakness, dizziness or paresthesias  ENDOCRINE: NEGATIVE for temperature intolerance, skin/hair changes  HEME: NEGATIVE for bleeding problems  PSYCHIATRIC: NEGATIVE for changes in mood or affect    Objective    Reported vitals:  There were no vitals taken for this visit.     healthy, alert and no distress  Psych: Alert and oriented times 3; coherent speech, normal   rate and volume, able to articulate logical thoughts, able   to abstract reason, no tangential thoughts, no hallucinations   or delusions  His affect is normal   RESP: No cough, no audible wheezing, able to talk in full sentences  Remainder of exam unable to be completed due to telephone visits    Diagnostic Test Results:  Labs reviewed in Epic        Assessment/Plan:      ICD-10-CM    1. Anxiety  F41.9 venlafaxine (EFFEXOR XR) 75 MG 24 hr capsule   2. Major depressive disorder, single episode, mild (H)  F32.0 venlafaxine (EFFEXOR XR) 75 MG 24  hr capsule   3. Left-sided temporomandibular joint pain-dysfunction syndrome  M26.622 OTOLARYNGOLOGY REFERRAL   4. Medication monitoring encounter  Z51.81      Increase effexor xr from 150 mg to 225 mg daily  Heat/ice, NSAID's, no gum chewing, TMJ clinic    Return in about 6 weeks (around 5/3/2021), or if symptoms worsen or fail to improve, for Medication Recheck Visit.    Phone call duration:  18 minutes           Lm Bear MD, FAAFP     [facility]  [address] [place] [address]  [email or web address]   Office: [phone]  Fax: [phone]  Pager: [phone]

## 2021-03-23 ASSESSMENT — ANXIETY QUESTIONNAIRES: GAD7 TOTAL SCORE: 10

## 2021-04-03 ENCOUNTER — HEALTH MAINTENANCE LETTER (OUTPATIENT)
Age: 21
End: 2021-04-03

## 2021-07-21 ENCOUNTER — VIRTUAL VISIT (OUTPATIENT)
Dept: FAMILY MEDICINE | Facility: CLINIC | Age: 21
End: 2021-07-21
Payer: COMMERCIAL

## 2021-07-21 DIAGNOSIS — F41.9 ANXIETY: ICD-10-CM

## 2021-07-21 DIAGNOSIS — F32.0 MAJOR DEPRESSIVE DISORDER, SINGLE EPISODE, MILD (H): Primary | ICD-10-CM

## 2021-07-21 DIAGNOSIS — Z51.81 MEDICATION MONITORING ENCOUNTER: ICD-10-CM

## 2021-07-21 PROCEDURE — 96127 BRIEF EMOTIONAL/BEHAV ASSMT: CPT | Mod: 95 | Performed by: FAMILY MEDICINE

## 2021-07-21 PROCEDURE — 99214 OFFICE O/P EST MOD 30 MIN: CPT | Mod: 95 | Performed by: FAMILY MEDICINE

## 2021-07-21 ASSESSMENT — ANXIETY QUESTIONNAIRES
8. IF YOU CHECKED OFF ANY PROBLEMS, HOW DIFFICULT HAVE THESE MADE IT FOR YOU TO DO YOUR WORK, TAKE CARE OF THINGS AT HOME, OR GET ALONG WITH OTHER PEOPLE?: NOT DIFFICULT AT ALL
3. WORRYING TOO MUCH ABOUT DIFFERENT THINGS: NOT AT ALL
GAD7 TOTAL SCORE: 0
6. BECOMING EASILY ANNOYED OR IRRITABLE: NOT AT ALL
7. FEELING AFRAID AS IF SOMETHING AWFUL MIGHT HAPPEN: NOT AT ALL
GAD7 TOTAL SCORE: 0
2. NOT BEING ABLE TO STOP OR CONTROL WORRYING: NOT AT ALL
1. FEELING NERVOUS, ANXIOUS, OR ON EDGE: NOT AT ALL
GAD7 TOTAL SCORE: 0
4. TROUBLE RELAXING: NOT AT ALL
7. FEELING AFRAID AS IF SOMETHING AWFUL MIGHT HAPPEN: NOT AT ALL
5. BEING SO RESTLESS THAT IT IS HARD TO SIT STILL: NOT AT ALL

## 2021-07-21 ASSESSMENT — PATIENT HEALTH QUESTIONNAIRE - PHQ9
10. IF YOU CHECKED OFF ANY PROBLEMS, HOW DIFFICULT HAVE THESE PROBLEMS MADE IT FOR YOU TO DO YOUR WORK, TAKE CARE OF THINGS AT HOME, OR GET ALONG WITH OTHER PEOPLE: SOMEWHAT DIFFICULT
SUM OF ALL RESPONSES TO PHQ QUESTIONS 1-9: 10
SUM OF ALL RESPONSES TO PHQ QUESTIONS 1-9: 10

## 2021-07-21 NOTE — PROGRESS NOTES
Jensen is a 20 year old who is being evaluated via a billable telephone visit.      What phone number would you like to be contacted at? [phone]  How would you like to obtain your AVS? MyChart    Assessment & Plan       ICD-10-CM    1. Major depressive disorder, single episode, mild (H)  F32.0    2. Anxiety  F41.9    3. Medication monitoring encounter  Z51.81        Discussed treatment/modality options, including risk and benefits, he desires:    1) decrease effexor XR to 150 mg    2) close follow up    3) hold on psychology/psychiatry    All diagnosis above reviewed and noted above, otherwise stable.      See [facility] orders for further details.        BMI:   Estimated body mass index is 31.1 kg/m  as calculated from the following:    Height as of 8/11/20: 1.829 m (6').    Weight as of 8/11/20: 104 kg (229 lb 4.5 oz).        Return in about 3 weeks (around 8/11/2021).    No LOS data to display    Doing chart review, history and exam, documentation and further activities as noted.           Lm Bear MD, FAAFP     [facility]  [address] [place] [address]  [email or web address]   Office: [phone]  Fax: [phone]  Pager: [phone]     Subjective   Jensen is a 20 year old who presents for the following health issues     HPI     Answers for HPI/ROS submitted by the patient on 7/21/2021  If you checked off any problems, how difficult have these problems made it for you to do your work, take care of things at home, or get along with other people?: Somewhat difficult  PHQ9 TOTAL SCORE: 10  SERGIO 7 TOTAL SCORE: 0      Depression and Anxiety Follow-Up - on effexor xr 225 mg daily, just's feels empty, tired, no snoring, no known apnea, un refreshed in AM      How are you doing with your depression since your last visit? Improved     How are you doing with your anxiety since your last visit?  Improved     Are you having  other symptoms that might be associated with depression or anxiety? Yes:  extremelty tired- daily     Have you had a significant life event? OTHER: just started a new job in June     Do you have any concerns with your use of alcohol or other drugs? No    Social History     Tobacco Use     Smoking status: Never Smoker     Smokeless tobacco: Never Used   Substance Use Topics     Alcohol use: Yes     Alcohol/week: 0.0 standard drinks     Comment: 1-2 per week     Drug use: No     PHQ 3/22/2021 7/21/2021 7/21/2021   PHQ-9 Total Score 5 10 10   Q9: Thoughts of better off dead/self-harm past 2 weeks Not at all Not at all Not at all   F/U: Thoughts of suicide or self-harm - - -   F/U: Self harm-plan - - -   F/U: Self-harm action - - -   F/U: Safety concerns - - -     SERGIO-7 SCORE 3/22/2021 7/21/2021 7/21/2021   Total Score - - 0 (minimal anxiety)   Total Score 10 0 0     Suicide Assessment Five-step Evaluation and Treatment (SAFE-T)      How many servings of fruits and vegetables do you eat daily?  2-3    On average, how many sweetened beverages do you drink each day (Examples: soda, juice, sweet tea, etc.  Do NOT count diet or artificially sweetened beverages)?   1-2    How many days per week do you exercise enough to make your heart beat faster? 5    How many minutes a day do you exercise enough to make your heart beat faster? 30 - 60    How many days per week do you miss taking your medication? 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 Improved     How are you doing with your anxiety since your last visit?  Worsened     Are you having  other symptoms that might be associated with depression or anxiety? Yes:  Panic attacts , worry    Have you had a significant life event? OTHER: School             Do you have any concerns with your use of alcohol or other drugs? No    Review of Systems   CONSTITUTIONAL: NEGATIVE for fever, chills, change in weight  INTEGUMENTARY/SKIN: NEGATIVE for worrisome rashes, moles or lesions  EYES: NEGATIVE for vision changes or irritation  ENT/MOUTH: NEGATIVE for ear, mouth and throat problems  RESP: NEGATIVE for significant cough or SOB  CV: NEGATIVE for chest pain, palpitations or peripheral edema  GI: NEGATIVE for nausea, abdominal pain, heartburn, or change in bowel habits  : NEGATIVE for frequency, dysuria, or hematuria  MUSCULOSKELETAL: NEGATIVE for significant arthralgias or myalgia  NEURO: NEGATIVE for weakness, dizziness or paresthesias  ENDOCRINE: NEGATIVE for temperature intolerance, skin/hair changes  HEME: NEGATIVE for bleeding problems  PSYCHIATRIC: NEGATIVE for changes in mood or affect      Objective           Vitals:  No vitals were obtained today due to virtual visit.    Physical Exam   healthy, alert and no distress  PSYCH: Alert and oriented times 3; coherent speech, normal   rate and volume, able to articulate logical thoughts, able   to abstract reason, no tangential thoughts, no hallucinations   or delusions  His affect is normal  RESP: No cough, no audible wheezing, able to talk in full sentences  Remainder of exam unable to be completed due to telephone visits    Reviewed notes/labs    Phone call duration: 14 minutes

## 2021-07-22 ASSESSMENT — PATIENT HEALTH QUESTIONNAIRE - PHQ9: SUM OF ALL RESPONSES TO PHQ QUESTIONS 1-9: 10

## 2021-07-22 ASSESSMENT — ANXIETY QUESTIONNAIRES: GAD7 TOTAL SCORE: 0

## 2021-09-12 ENCOUNTER — HEALTH MAINTENANCE LETTER (OUTPATIENT)
Age: 21
End: 2021-09-12

## 2021-12-08 DIAGNOSIS — F41.9 ANXIETY: ICD-10-CM

## 2021-12-13 RX ORDER — VENLAFAXINE HYDROCHLORIDE 150 MG/1
CAPSULE, EXTENDED RELEASE ORAL
Qty: 30 CAPSULE | Refills: 0 | Status: SHIPPED | OUTPATIENT
Start: 2021-12-13 | End: 2022-01-17

## 2021-12-13 NOTE — TELEPHONE ENCOUNTER
Due for an Office visit for further refills. 30 day refill given.     Tatiana EARLY RN   [facility] Triage

## 2022-01-13 DIAGNOSIS — F41.9 ANXIETY: ICD-10-CM

## 2022-01-17 RX ORDER — VENLAFAXINE HYDROCHLORIDE 150 MG/1
CAPSULE, EXTENDED RELEASE ORAL
Qty: 30 CAPSULE | Refills: 0 | Status: SHIPPED | OUTPATIENT
Start: 2022-01-17 | End: 2022-02-01

## 2022-02-26 DIAGNOSIS — F41.9 ANXIETY: ICD-10-CM

## 2022-02-28 NOTE — TELEPHONE ENCOUNTER
Please call patient to schedule an appointment   Due for an Office visit for further refills.     Last yearly physical appointment was 7/21/21.   2/14/22 appointment was cancelled    Please route back to RV Refill once appointment has been made so heriberto refills can be provided.    Name from pharmacy: VENLAFAXINE HCL  MG CAP          Will file in chart as: venlafaxine (EFFEXOR-XR) 150 MG 24 hr capsule    Sig: TAKE 1 CAPSULE BY MOUTH EVERY DAY    Disp:  90 capsule    Refills:  1    Start: 2/26/2022    Class: E-Prescribe    Non-formulary For: Anxiety    Last ordered: 3 weeks ago by Lm Bear MD     Rx #: [id number]    Pharmacy comment: REQUEST FOR 90 DAYS PRESCRIPTION.    Serotonin-Norepinephrine Reuptake Inhibitors  Failed 02/26/2022 05:51 AM   Protocol Details  Blood pressure under 140/90 in past 12 months    Normal serum creatinine on file in past 12 months    Recent (12 mo) or future (30 days) visit within the authorizing provider's specialty    Medication is active on med list    Patient is age 18 or older      This request has changes from the previous prescription.   To be filled at: [facility] [id number] [place] [facility] [address] [facility] [address]     BP Readings from Last 3 Encounters:   08/11/20 (!) 146/79   11/29/19 128/78   03/11/19 132/78     Creatinine 1/2/2017 0.91    Thank you!  Tatiana EARLY RN   [facility] Triage

## 2022-03-07 NOTE — TELEPHONE ENCOUNTER
Patient was driving once he arrives at his destination will schedule via [employer].     Mike Jean

## 2022-03-08 RX ORDER — VENLAFAXINE HYDROCHLORIDE 150 MG/1
CAPSULE, EXTENDED RELEASE ORAL
Qty: 90 CAPSULE | Refills: 1 | Status: SHIPPED | OUTPATIENT
Start: 2022-03-08 | End: 2022-04-26

## 2022-03-08 NOTE — TELEPHONE ENCOUNTER
Routing refill request to provider for review/approval because:  Pt hs scheduled appt but it is 1 month out ok for another RF    Debra Stephens RN       PHQ-9 score:    PHQ 1/21/2022   PHQ-9 Total Score 6   Q9: Thoughts of better off dead/self-harm past 2 weeks Not at all   F/U: Thoughts of suicide or self-harm -   F/U: Self harm-plan -   F/U: Self-harm action -   F/U: Safety concerns -

## 2022-04-24 ENCOUNTER — HEALTH MAINTENANCE LETTER (OUTPATIENT)
Age: 22
End: 2022-04-24

## 2022-04-26 ENCOUNTER — OFFICE VISIT (OUTPATIENT)
Dept: FAMILY MEDICINE | Facility: CLINIC | Age: 22
End: 2022-04-26
Payer: COMMERCIAL

## 2022-04-26 VITALS
DIASTOLIC BLOOD PRESSURE: 76 MMHG | HEIGHT: 73 IN | OXYGEN SATURATION: 99 % | SYSTOLIC BLOOD PRESSURE: 118 MMHG | HEART RATE: 80 BPM | TEMPERATURE: 96.1 F | WEIGHT: 275 LBS | BODY MASS INDEX: 36.45 KG/M2

## 2022-04-26 DIAGNOSIS — F32.0 MAJOR DEPRESSIVE DISORDER, SINGLE EPISODE, MILD (H): Primary | ICD-10-CM

## 2022-04-26 DIAGNOSIS — F41.9 ANXIETY: ICD-10-CM

## 2022-04-26 DIAGNOSIS — Z51.81 MEDICATION MONITORING ENCOUNTER: ICD-10-CM

## 2022-04-26 PROCEDURE — 99214 OFFICE O/P EST MOD 30 MIN: CPT | Performed by: FAMILY MEDICINE

## 2022-04-26 RX ORDER — VENLAFAXINE HYDROCHLORIDE 150 MG/1
150 CAPSULE, EXTENDED RELEASE ORAL DAILY
Qty: 90 CAPSULE | Refills: 3 | Status: SHIPPED | OUTPATIENT
Start: 2022-04-26 | End: 2022-10-10

## 2022-04-26 ASSESSMENT — ANXIETY QUESTIONNAIRES
7. FEELING AFRAID AS IF SOMETHING AWFUL MIGHT HAPPEN: NOT AT ALL
3. WORRYING TOO MUCH ABOUT DIFFERENT THINGS: NOT AT ALL
5. BEING SO RESTLESS THAT IT IS HARD TO SIT STILL: SEVERAL DAYS
7. FEELING AFRAID AS IF SOMETHING AWFUL MIGHT HAPPEN: NOT AT ALL
1. FEELING NERVOUS, ANXIOUS, OR ON EDGE: NOT AT ALL
GAD7 TOTAL SCORE: 2
4. TROUBLE RELAXING: NOT AT ALL
2. NOT BEING ABLE TO STOP OR CONTROL WORRYING: NOT AT ALL
GAD7 TOTAL SCORE: 2
GAD7 TOTAL SCORE: 2
6. BECOMING EASILY ANNOYED OR IRRITABLE: SEVERAL DAYS

## 2022-04-26 ASSESSMENT — PATIENT HEALTH QUESTIONNAIRE - PHQ9
SUM OF ALL RESPONSES TO PHQ QUESTIONS 1-9: 3
SUM OF ALL RESPONSES TO PHQ QUESTIONS 1-9: 3
10. IF YOU CHECKED OFF ANY PROBLEMS, HOW DIFFICULT HAVE THESE PROBLEMS MADE IT FOR YOU TO DO YOUR WORK, TAKE CARE OF THINGS AT HOME, OR GET ALONG WITH OTHER PEOPLE: NOT DIFFICULT AT ALL

## 2022-04-26 NOTE — PROGRESS NOTES
"  Assessment & Plan       ICD-10-CM    1. Major depressive disorder, single episode, mild (H)  F32.0    2. Anxiety  F41.9 venlafaxine (EFFEXOR-XR) 150 MG 24 hr capsule   3. Medication monitoring encounter  Z51.81        Discussed treatment/modality options, including risk and benefits, he desires:    1) refill Effexor XR    2) Keep proxy for Dad, Morteza Durham    3) Tdap when desires    4) CPX in fall    All diagnosis above reviewed and noted above, otherwise stable.      See [facility] orders for further details.      BMI:   Estimated body mass index is 36.03 kg/m  as calculated from the following:    Height as of this encounter: 1.861 m (6' 1.25\").    Weight as of this encounter: 124.7 kg (275 lb).   Weight management plan: diet and exercise    Return in about 6 months (around 10/26/2022) for Complete Physical, Medication Recheck Visit, Follow Up Chronic.    No LOS data to display    Doing chart review, history and exam, documentation and further activities as noted.           Lm Bear MD, FAAFP     [facility]  [address] [place] [id number]  [email or web address]   Office: [phone]  Fax: [phone]  Pager: [phone]       Tomasa Styles is a 21 year old who presents for the following health issues.    History of Present Illness       Mental Health Follow-up:  Patient presents to follow-up on Depression.Patient's depression since last visit has been:  Good  The patient is not having other symptoms associated with depression.      Any significant life events: No  Patient is not feeling anxious or having panic attacks.  Patient has no concerns about alcohol or drug use.       Today's PHQ-9         PHQ-9 Total Score: 3  PHQ-9 Q9 Thoughts of better off dead/self-harm past 2 weeks :   (P) Not at all    How difficult have these problems made it for you to do your work, take care of things at home, or get along with " other people: Not difficult at all    Today's SERGIO-7 Score: 2    He eats 2-3 servings of fruits and vegetables daily.He consumes 1 sweetened beverage(s) daily.He exercises with enough effort to increase his heart rate 20 to 29 minutes per day.  He exercises with enough effort to increase his heart rate 4 days per week.   He is taking medications regularly.     4/26/2022    Has graduated from [employer] - working at [employer], calibration labs    On Effexor  mg - no med side effects    Depression and Anxiety Follow-Up    PHQ 7/21/2021 1/21/2022 4/26/2022   PHQ-9 Total Score 10 6 3   Q9: Thoughts of better off dead/self-harm past 2 weeks Not at all Not at all Not at all   F/U: Thoughts of suicide or self-harm - - -   F/U: Self harm-plan - - -   F/U: Self-harm action - - -   F/U: Safety concerns - - -     SERGIO-7 SCORE 7/21/2021 1/21/2022 4/26/2022   Total Score 0 (minimal anxiety) 0 (minimal anxiety) 2 (minimal anxiety)   Total Score 0 0 2     Last PHQ-9 4/26/2022   1.  Little interest or pleasure in doing things 1   2.  Feeling down, depressed, or hopeless 0   3.  Trouble falling or staying asleep, or sleeping too much 1   4.  Feeling tired or having little energy 1   5.  Poor appetite or overeating 0   6.  Feeling bad about yourself 0   7.  Trouble concentrating 0   8.  Moving slowly or restless 0   Q9: Thoughts of better off dead/self-harm past 2 weeks 0   PHQ-9 Total Score 3   Difficulty at work, home, or with people -   In the past two weeks have you had thoughts of suicide or self harm? -   Do you have concerns about your personal safety or the safety of others? -   In the past 2 weeks have you thought about a plan or had intention to harm yourself? -   In the past 2 weeks have you acted on these thoughts in any way? -     SERGIO-7  4/26/2022   1. Feeling nervous, anxious, or on edge 0   2. Not being able to stop or control worrying 0   3. Worrying too much about different things 0   4. Trouble relaxing 0   5.  Being so restless that it is hard to sit still 1   6. Becoming easily annoyed or irritable 1   7. Feeling afraid, as if something awful might happen 0   SERGIO-7 Total Score 2   If you checked any problems, how difficult have they made it for you to do your work, take care of things at home, or get along with other people? -     Suicide Assessment Five-step Evaluation and Treatment (SAFE-T)  {Provider  Link to Depression Care Package SmartSet :47961    Social History     Tobacco Use     Smoking status: Never Smoker     Smokeless tobacco: Never Used   Substance Use Topics     Alcohol use: Yes     Alcohol/week: 0.0 standard drinks     Comment: 1-2 per week     Drug use: No     7/21/2022    Answers for HPI/ROS submitted by the patient on 7/21/2021  If you checked off any problems, how difficult have these problems made it for you to do your work, take care of things at home, or get along with other people?: Somewhat difficult  PHQ9 TOTAL SCORE: 10  SERGIO 7 TOTAL SCORE: 0     Depression and Anxiety Follow-Up - on effexor xr 225 mg daily, just's feels empty, tired, no snoring, no known apnea, un refreshed in AM       How are you doing with your depression since your last visit? Improved     How are you doing with your anxiety since your last visit?  Improved     Are you having other symptoms that might be associated with depression or anxiety? Yes:  extremelty tired- daily     Have you had a significant life event? OTHER: just started a new job in June            Do you have any concerns with your use of alcohol or other drugs? No     Social History      Tobacco Use     Smoking status: Never Smoker     Smokeless tobacco: Never Used   Substance Use Topics     Alcohol use: Yes       Alcohol/week: 0.0 standard drinks       Comment: 1-2 per week     Drug use: No      PHQ 3/22/2021 7/21/2021 7/21/2021   PHQ-9 Total Score 5 10 10   Q9: Thoughts of better off dead/self-harm past 2 weeks Not at all Not at all Not at all   F/U:  Thoughts of suicide or self-harm - - -   F/U: Self harm-plan - - -   F/U: Self-harm action - - -   F/U: Safety concerns - - -      SERGIO-7 SCORE 3/22/2021 7/21/2021 7/21/2021   Total Score - - 0 (minimal anxiety)   Total Score 10 0 0      Suicide Assessment Five-step Evaluation and Treatment (SAFE-T)       How many servings of fruits and vegetables do you eat daily?  2-3    On average, how many sweetened beverages do you drink each day (Examples: soda, juice, sweet tea, etc.  Do NOT count diet or artificially sweetened beverages)?   1-2    How many days per week do you exercise enough to make your heart beat faster? 5    How many minutes a day do you exercise enough to make your heart beat faster? 30 - 60    How many days per week do you miss taking your medication? 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 Improved     How are you doing with your anxiety since your last visit?  Worsened     Are you having other symptoms that might be associated with depression or anxiety? Yes:  Panic attacts , worry    Have you had a significant life event? OTHER: School             Do you have any concerns with your use of alcohol or other drugs? No    Past Medical History:   Diagnosis Date     Anxiety      heart murmur congenital    asymptomatic     Major depressive disorder, single episode, mild (H)      Past Surgical History:   Procedure Laterality Date     NO HISTORY OF SURGERY       Family History   Problem Relation Age of Onset     C.A.D. Maternal Grandfather      Brain Cancer Paternal Grandfather         Skin cancer origin?     Depression  Maternal Grandmother         as well as other maternal side relatives     Social History     Socioeconomic History     Marital status: Single     Number of children: 0   Occupational History     Occupation: [employer]     Employer: STUDENT     Comment: Biology   Tobacco Use     Smoking status: Never Smoker     Smokeless tobacco: Never Used   Substance and Sexual Activity     Alcohol use: Yes     Alcohol/week: 0.0 standard drinks     Comment: 1-2 per week     Drug use: No     Sexual activity: Yes   Other Topics Concern     Caffeine Concern Yes     Comment: rare     Exercise Yes     Comment: FB -2 hrs > 4 times per week     Bike Helmet Yes     Seat Belt Yes     Current Outpatient Medications   Medication Sig Dispense Refill     venlafaxine (EFFEXOR-XR) 150 MG 24 hr capsule Take 1 capsule (150 mg) by mouth daily 90 capsule 3        Allergies   Allergen Reactions     Sulfa Drugs Rash     Red and blotchy rash, happened when pt was a few years old     Zithromax [Azithromycin Dihydrate] Rash     Red and blotchy rash, happened when pt was a few years old.     Review of Systems   CONSTITUTIONAL: NEGATIVE for fever, chills, change in weight  INTEGUMENTARY/SKIN: NEGATIVE for worrisome rashes, moles or lesions  EYES: NEGATIVE for vision changes or irritation  ENT/MOUTH: NEGATIVE for ear, mouth and throat problems  RESP: NEGATIVE for significant cough or SOB  CV: NEGATIVE for chest pain, palpitations or peripheral edema  GI: NEGATIVE for nausea, abdominal pain, heartburn, or change in bowel habits  : NEGATIVE for frequency, dysuria, or hematuria  MUSCULOSKELETAL: NEGATIVE for significant arthralgias or myalgia  NEURO: NEGATIVE for weakness, dizziness or paresthesias  ENDOCRINE: NEGATIVE for temperature intolerance, skin/hair changes  HEME: NEGATIVE for bleeding problems  PSYCHIATRIC: NEGATIVE for changes in mood or affect      Objective    /76 (BP Location: Right arm, Patient Position: Sitting, Cuff Size: Adult  "Large)   Pulse 80   Temp (!) 96.1  F (35.6  C) (Tympanic)   Ht 1.861 m (6' 1.25\")   Wt 124.7 kg (275 lb)   SpO2 99%   BMI 36.03 kg/m    Body mass index is 36.03 kg/m .  Physical Exam   GENERAL: healthy, alert and no distress  EYES: Eyes grossly normal to inspection, PERRL and conjunctivae and sclerae normal  HENT: ear canals and TM's normal, nose and mouth without ulcers or lesions  NECK: no adenopathy, no asymmetry, masses, or scars and thyroid normal to palpation  RESP: lungs clear to auscultation - no rales, rhonchi or wheezes  CV: regular rate and rhythm, normal S1 S2, no S3 or S4, no murmur, click or rub, no peripheral edema and peripheral pulses strong  ABDOMEN: soft, nontender, no hepatosplenomegaly, no masses and bowel sounds normal  MS: no gross musculoskeletal defects noted, no edema  SKIN: no suspicious lesions or rashes  NEURO: Normal strength and tone, mentation intact and speech normal  PSYCH: mentation appears normal, affect normal/bright    Reviewed recent labs        "

## 2022-04-27 ASSESSMENT — PATIENT HEALTH QUESTIONNAIRE - PHQ9: SUM OF ALL RESPONSES TO PHQ QUESTIONS 1-9: 3

## 2022-04-27 ASSESSMENT — ANXIETY QUESTIONNAIRES: GAD7 TOTAL SCORE: 2

## 2022-07-13 ENCOUNTER — OFFICE VISIT (OUTPATIENT)
Dept: ORTHOPEDICS | Facility: CLINIC | Age: 22
End: 2022-07-13
Payer: COMMERCIAL

## 2022-07-13 VITALS
DIASTOLIC BLOOD PRESSURE: 96 MMHG | SYSTOLIC BLOOD PRESSURE: 132 MMHG | WEIGHT: 291.4 LBS | HEIGHT: 73 IN | BODY MASS INDEX: 38.62 KG/M2

## 2022-07-13 DIAGNOSIS — M25.511 ARTHRALGIA OF RIGHT ACROMIOCLAVICULAR JOINT: Primary | ICD-10-CM

## 2022-07-13 PROCEDURE — 99204 OFFICE O/P NEW MOD 45 MIN: CPT | Performed by: FAMILY MEDICINE

## 2022-07-13 RX ORDER — MELOXICAM 15 MG/1
15 TABLET ORAL DAILY
Qty: 30 TABLET | Refills: 1 | Status: SHIPPED | OUTPATIENT
Start: 2022-07-13 | End: 2022-10-10

## 2022-07-13 NOTE — LETTER
"    7/13/2022         RE: John Paul Durham  [address]  [facility] [address]        Dear Colleague,    Thank you for referring your patient, John Paul Durham, to the [facility] [place]. Please see a copy of my visit note below.    ASSESSMENT & PLAN  Patient Instructions     1. Arthralgia of right acromioclavicular joint      -Patient has right shoulder pain due to inflammation of the right AC joint from an unknown cause  -Patient will start meloxicam 15 mg daily for the next 2 weeks and then on an as-needed basis as pain improves  -Patient may continue icing the shoulder as needed as well  Patient will start formal physical therapy and home exercise program  -Patient may continue with any activity or workout as his pain allows  -Patient will follow up if pain does not improve  -Call direct clinic number [[phone]] at any time with questions or concerns.    Albert Yeo MD [facility]  [facility] [place]          -----    SUBJECTIVE  John Paul Durham is a/an 21 year old Right handed male who is seen as a self referral for evaluation of right shoulder pain. The patient is seen by themselves.    Onset: 3 week(s) ago. Reports insidious onset without acute precipitating event. Notes he had started noticing this pain, went golfing the next day which exacerbated his pain  Location of Pain: right dorsal shoulder, states \"deep in shoulder\"  Rating of Pain at worst: 7/10  Rating of Pain Currently: 5/10  Worsened by: \"any movement of arm\" horizontal adduction, pushing away from him, reaching above him  Better with: not moving arm, rest/activity avoidance  Treatments tried: ibuprofen, ice   Associated symptoms: weakness of shoulder, denies numbness or tingling   Orthopedic history: NO  Relevant surgical history: NO  Social history: social history: works as  at manufacturing company     Past Medical History: " "  Diagnosis Date     Anxiety      heart murmur congenital    asymptomatic     Major depressive disorder, single episode, mild (H)      Social History     Socioeconomic History     Marital status: Single     Number of children: 0   Occupational History     Occupation: [employer]     Employer: STUDENT     Comment: Biology   Tobacco Use     Smoking status: Never Smoker     Smokeless tobacco: Never Used   Substance and Sexual Activity     Alcohol use: Yes     Alcohol/week: 0.0 standard drinks     Comment: 1-2 per week     Drug use: No     Sexual activity: Yes   Other Topics Concern     Caffeine Concern Yes     Comment: rare     Exercise Yes     Comment: FB -2 hrs > 4 times per week     Bike Helmet Yes     Seat Belt Yes         Patient's past medical, surgical, social, and family histories were reviewed today and no changes are noted.    REVIEW OF SYSTEMS:  10 point ROS is negative other than symptoms noted above in HPI, Past Medical History or as stated below  Constitutional: NEGATIVE for fever, chills, change in weight  Skin: NEGATIVE for worrisome rashes, moles or lesions  GI/: NEGATIVE for bowel or bladder changes  Neuro: NEGATIVE for weakness, dizziness or paresthesias    OBJECTIVE:  BP (!) 132/96   Ht 1.861 m (6' 1.25\")   Wt 132.2 kg (291 lb 6.4 oz)   BMI 38.18 kg/m     General: healthy, alert and in no distress  HEENT: no scleral icterus or conjunctival erythema  Skin: no suspicious lesions or rash. No jaundice.  CV: regular rhythm by palpation  Resp: normal respiratory effort without conversational dyspnea   Psych: normal mood and affect  Gait: normal steady gait with appropriate coordination and balance  Neuro: normal light touch sensory exam of the bilateral upper extremities.    MSK:  RIGHT SHOULDER  Inspection:    no swelling, bruising, discoloration, or obvious deformity or asymmetry  Palpation:    Tender about the AC joint. Remainder of bony and tendinous landmarks are nontender.  Active Range of " Motion:     Abduction normal0, FF normal0, ER normal0, IR normal.      Scapular dyskinesis absent  Strength:    Scapular plane abduction grossly intact,  ER grossly intact, IR grossly intact, biceps grossly intact, triceps grossly intact  Special Tests:    Positive: crossed arm adduction    Negative: Neer's, Ruiz', supraspinatus (empty can), drop arm/painful arc, [place]'s, Speed's and Yergason's      Independent visualization of the below image:  No results found for this or any previous visit (from the past 24 hour(s)).        Albert Yeo MD [facility]        Again, thank you for allowing me to participate in the care of your patient.        Sincerely,        Albert Yeo, MD

## 2022-07-13 NOTE — PATIENT INSTRUCTIONS
1. Arthralgia of right acromioclavicular joint      -Patient has right shoulder pain due to inflammation of the right AC joint from an unknown cause  -Patient will start meloxicam 15 mg daily for the next 2 weeks and then on an as-needed basis as pain improves  -Patient may continue icing the shoulder as needed as well  Patient will start formal physical therapy and home exercise program  -Patient may continue with any activity or workout as his pain allows  -Patient will follow up if pain does not improve  -Call direct clinic number [[phone]] at any time with questions or concerns.    Albert Yeo MD CAQSM  [facility]  [facility] [place]

## 2022-07-13 NOTE — PROGRESS NOTES
"ASSESSMENT & PLAN  Patient Instructions     1. Arthralgia of right acromioclavicular joint      -Patient has right shoulder pain due to inflammation of the right AC joint from an unknown cause  -Patient will start meloxicam 15 mg daily for the next 2 weeks and then on an as-needed basis as pain improves  -Patient may continue icing the shoulder as needed as well  Patient will start formal physical therapy and home exercise program  -Patient may continue with any activity or workout as his pain allows  -Patient will follow up if pain does not improve  -Call direct clinic number [[phone]] at any time with questions or concerns.    Albert Yeo MD [facility]  [facility]          -----    SUBJECTIVE  John Paul Durham is a/an 21 year old Right handed male who is seen as a self referral for evaluation of right shoulder pain. The patient is seen by themselves.    Onset: 3 week(s) ago. Reports insidious onset without acute precipitating event. Notes he had started noticing this pain, went golfing the next day which exacerbated his pain  Location of Pain: right dorsal shoulder, states \"deep in shoulder\"  Rating of Pain at worst: 7/10  Rating of Pain Currently: 5/10  Worsened by: \"any movement of arm\" horizontal adduction, pushing away from him, reaching above him  Better with: not moving arm, rest/activity avoidance  Treatments tried: ibuprofen, ice   Associated symptoms: weakness of shoulder, denies numbness or tingling   Orthopedic history: NO  Relevant surgical history: NO  Social history: social history: works as  at manufacturing company     Past Medical History:   Diagnosis Date     Anxiety      heart murmur congenital    asymptomatic     Major depressive disorder, single episode, mild (H)      Social History     Socioeconomic History     Marital status: Single     Number of children: 0   Occupational History     Occupation: [employer]     Employer: STUDENT     Comment: Biology   Tobacco Use     Smoking status: Never Smoker     Smokeless tobacco: Never Used   Substance and Sexual Activity     Alcohol use: Yes     Alcohol/week: 0.0 standard drinks     Comment: 1-2 per week     Drug use: No     Sexual activity: Yes   Other Topics Concern     Caffeine Concern Yes     Comment: rare     Exercise Yes     Comment: FB -2 hrs > 4 times per week     Bike Helmet Yes     Seat Belt Yes         Patient's past medical, surgical, social, and family histories were reviewed today and no changes are noted.    REVIEW OF SYSTEMS:  10 point ROS is negative other than symptoms noted above in HPI, Past Medical History or as stated below  Constitutional: NEGATIVE for fever, chills, change in weight  Skin: NEGATIVE for worrisome rashes, moles or lesions  GI/: NEGATIVE for bowel or bladder changes  Neuro: NEGATIVE for weakness, dizziness or paresthesias    OBJECTIVE:  BP (!) 132/96   Ht 1.861 m (6' 1.25\")   Wt 132.2 kg (291 lb 6.4 oz)   BMI 38.18 kg/m     General: healthy, alert and in no distress  HEENT: no scleral icterus or conjunctival erythema  Skin: no suspicious lesions or rash. No jaundice.  CV: regular rhythm by palpation  Resp: normal respiratory effort without conversational dyspnea   Psych: normal mood and affect  Gait: normal steady gait with appropriate coordination and balance  Neuro: normal light touch sensory exam of the bilateral upper extremities.    MSK:  RIGHT SHOULDER  Inspection:    no swelling, bruising, discoloration, or obvious deformity or asymmetry  Palpation:    Tender about the AC joint. Remainder of bony and tendinous landmarks are nontender.  Active Range of Motion:     Abduction normal0, FF normal0, ER normal0, IR normal.      Scapular dyskinesis absent  Strength:    Scapular plane abduction grossly intact,  ER grossly intact, IR grossly intact, biceps grossly intact, triceps grossly intact  Special Tests:    Positive: crossed " arm adduction    Negative: Neer's, Ruiz', supraspinatus (empty can), drop arm/painful arc, Steele's, Speed's and Susana's      Independent visualization of the below image:  No results found for this or any previous visit (from the past 24 hour(s)).        Albert Yeo MD [facility]

## 2022-07-18 NOTE — PROGRESS NOTES
"  Physical Therapy Initial Examination/Evaluation  July 20, 2022    John Paul Durham is a 21 year old male referred to physical therapy by Yeo, Albert, MD for treatment of Arthralgia of right acromioclavicular joint with Precautions/Restrictions/MD instructions Eval and treat.     Therapist Impression:   Gasper has complaints of R shoulder/clavicle pain that has been ongoing since June 25 with insidious onset. His pain is worse with reaching forward or across body and with lying on his right side. He was found to have impaired posture, painful arch with shoulder ROM and tenderness to AC palpation. He was given HEP with scap retraction, TB ER and seated / ergonomic posture.     Subjective:  DOI/onset: 6/25/2022   Acute Injury or Gradual Onset?: Gradual injury over time  Mechanism of Injury: unknown  Related PMH: None  Imaging: None   Chief Complaint/Functional Limitations:   R clavicle/shoulder pain and see below in therapy evaluation codes   Pain: rest 0 /10, activity 7/10 Location: Anterior clavicle Frequency: Intermittent Described as: aching Previous Treatment: Ice and Ibuprofen Effect of prior treatment: good Alleviated by: Ice and Ibuprofen Progression of Symptoms: Gradually getting better. Time of day when pain is worse: Morning and Activity related  Sleeping: Cannot lie on R side   Occupation:   Job duties: computer work, driving, lifting/carrying, prolonged sitting  Current HEP/exercise regimen: Golfing   Patient's goals are see chief complaints \"To get my range of motion back, be able to play golf\"    Other pertinent PMH/Red Flags: depression, overweight   Barriers at home/work: None as reported by patient  Pertinent Surgical History: None  Medications: anti-depressants  General health as reported by patient: good  Return to MD:  None    SHOULDER EXAMINATION  Diagnosis: R shoulder pain  R handedness     CERVICAL SCREEN  AROM: WNL/symmetrical all motions    STATIC POSTURE  Forward head: " moderate   Rounded shoulders:moderate  Shoulder internally rotated: mild     SHOULDER RANGE OF MOTION * = pain  AROM Flexion Abduction ER   Base Ext/IR   Left WNL WNL 70 T10   Right WNL with painful arc  WNL with pain at top 70 T10*       SHOULDER STRENGTH  MMT Flexion Abduction ER IR   Left 5/5 5/5 5/5 5/5   Right 5/5 5/5 5/5* 5/5     SPECIAL TESTS Left Right   Impingement Negative Negative  Subscapularis Negative Negative  AC joint Negative Positive    PALPATION  Right: Moderate tenderness to palpation at distal clavicle, AC joint     Assessment/Plan:  Patient is a 21 year old male with right side shoulder complaints.    Patient has the following significant findings with corresponding treatment plan.                Diagnosis 1:  R shoulder pain  Pain -  manual therapy and splint/taping/bracing/orthotics  Decreased ROM/flexibility - manual therapy and therapeutic exercise  Decreased strength - therapeutic exercise and therapeutic activities  Impaired muscle performance - neuro re-education  Decreased function - therapeutic activities  Impaired posture - neuro re-education    Therapy Evaluation Codes:   1) History comprised of:   Personal factors that impact the plan of care:      None.    Comorbidity factors that impact the plan of care are:      depression, overweight .     Medications impacting care: Anti-depressant.  2) Examination of Body Systems comprised of:   Body structures and functions that impact the plan of care:      Shoulder.   Activity limitations that impact the plan of care are:      Bathing, Lifting, Reading/Computer work, Sleeping and Laying down.  3) Clinical presentation characteristics are:   Stable/Uncomplicated.  4) Decision-Making    Low complexity using standardized patient assessment instrument and/or measureable assessment of functional outcome.  Cumulative Therapy Evaluation is: Low complexity.    Previous and current functional limitations:  (See Goal Flow Sheet for this  information)    Short term and Long term goals: (See Goal Flow Sheet for this information)     Communication ability:  Patient appears to be able to clearly communicate and understand verbal and written communication and follow directions correctly.  Treatment Explanation - The following has been discussed with the patient:   RX ordered/plan of care  Anticipated outcomes  Possible risks and side effects  This patient would benefit from PT intervention to resume normal activities.   Rehab potential is good.    Frequency:  1 X week, once daily  Duration:  for 4 weeks tapering to 2 X a month x1 month  Discharge Plan:  Achieve all LTG.  Independent in home treatment program.  Reach maximal therapeutic benefit.    Please refer to the daily flowsheet for treatment today, total treatment time and time spent performing 1:1 timed codes.

## 2022-07-20 ENCOUNTER — THERAPY VISIT (OUTPATIENT)
Dept: PHYSICAL THERAPY | Facility: CLINIC | Age: 22
End: 2022-07-20
Attending: FAMILY MEDICINE
Payer: COMMERCIAL

## 2022-07-20 DIAGNOSIS — M25.511 ARTHRALGIA OF RIGHT ACROMIOCLAVICULAR JOINT: Primary | ICD-10-CM

## 2022-07-20 DIAGNOSIS — M25.511 RIGHT SHOULDER PAIN: ICD-10-CM

## 2022-07-20 PROCEDURE — 97110 THERAPEUTIC EXERCISES: CPT | Mod: GP

## 2022-07-20 PROCEDURE — 97161 PT EVAL LOW COMPLEX 20 MIN: CPT | Mod: GP

## 2022-07-21 PROBLEM — M25.511 ARTHRALGIA OF RIGHT ACROMIOCLAVICULAR JOINT: Status: ACTIVE | Noted: 2022-07-21

## 2022-07-21 PROBLEM — M25.511 RIGHT SHOULDER PAIN: Status: ACTIVE | Noted: 2022-07-21

## 2022-08-22 PROBLEM — Z13.6 CARDIOVASCULAR SCREENING; LDL GOAL LESS THAN 160: Status: ACTIVE | Noted: 2022-08-22

## 2022-08-30 PROBLEM — M25.511 RIGHT SHOULDER PAIN: Status: RESOLVED | Noted: 2022-07-21 | Resolved: 2022-08-30

## 2022-08-30 PROBLEM — M25.511 ARTHRALGIA OF RIGHT ACROMIOCLAVICULAR JOINT: Status: RESOLVED | Noted: 2022-07-21 | Resolved: 2022-08-30

## 2022-09-07 NOTE — TELEPHONE ENCOUNTER
"Requested Prescriptions   Pending Prescriptions Disp Refills     sertraline (ZOLOFT) 50 MG tablet [Pharmacy Med Name: SERTRALINE HCL 50MG TABS] 90 tablet 1      Last Written Prescription Date:  3.5.18  Last Fill Quantity: 90,  # refills: 1   Last Office Visit: 3/5/2018   Future Office Visit:       PHQ-9 SCORE 1/3/2017 2/14/2017 3/5/2018   Total Score 13 2 2     SERGIO-7 SCORE 1/3/2017 2/14/2017 3/5/2018   Total Score 4 0 0          Sig: TAKE ONE TABLET BY MOUTH EVERY DAY    SSRIs Protocol Failed    11/23/2018  8:18 AM       Failed - PHQ-9 score less than 5 in past 6 months    Please review last PHQ-9 score.          Failed - Recent (6 mo) or future (30 days) visit within the authorizing provider's specialty    Patient had office visit in the last 6 months or has a visit in the next 30 days with authorizing provider or within the authorizing provider's specialty.  See \"Patient Info\" tab in inbasket, or \"Choose Columns\" in Meds & Orders section of the refill encounter.           Passed - Patient is age 18 or older          " Spoke to [facility], appt is October 12th at [facility]     After that testing he needs to follow up with the Neurologists     Pt informed

## 2022-10-10 ENCOUNTER — OFFICE VISIT (OUTPATIENT)
Dept: FAMILY MEDICINE | Facility: CLINIC | Age: 22
End: 2022-10-10
Payer: COMMERCIAL

## 2022-10-10 VITALS
HEART RATE: 101 BPM | WEIGHT: 297.9 LBS | HEIGHT: 73 IN | TEMPERATURE: 98 F | DIASTOLIC BLOOD PRESSURE: 76 MMHG | OXYGEN SATURATION: 98 % | SYSTOLIC BLOOD PRESSURE: 128 MMHG | BODY MASS INDEX: 39.48 KG/M2

## 2022-10-10 DIAGNOSIS — E66.01 MORBID OBESITY (H): ICD-10-CM

## 2022-10-10 DIAGNOSIS — F32.0 MAJOR DEPRESSIVE DISORDER, SINGLE EPISODE, MILD (H): ICD-10-CM

## 2022-10-10 DIAGNOSIS — Z23 NEED FOR INFLUENZA VACCINATION: ICD-10-CM

## 2022-10-10 DIAGNOSIS — Z23 NEED FOR TDAP VACCINATION: ICD-10-CM

## 2022-10-10 DIAGNOSIS — F41.9 ANXIETY: ICD-10-CM

## 2022-10-10 DIAGNOSIS — Z00.00 ROUTINE GENERAL MEDICAL EXAMINATION AT A HEALTH CARE FACILITY: Primary | ICD-10-CM

## 2022-10-10 DIAGNOSIS — Z51.81 MEDICATION MONITORING ENCOUNTER: ICD-10-CM

## 2022-10-10 DIAGNOSIS — Z13.6 CARDIOVASCULAR SCREENING; LDL GOAL LESS THAN 160: ICD-10-CM

## 2022-10-10 PROCEDURE — 90686 IIV4 VACC NO PRSV 0.5 ML IM: CPT | Performed by: FAMILY MEDICINE

## 2022-10-10 PROCEDURE — 99395 PREV VISIT EST AGE 18-39: CPT | Mod: 25 | Performed by: FAMILY MEDICINE

## 2022-10-10 PROCEDURE — 90472 IMMUNIZATION ADMIN EACH ADD: CPT | Performed by: FAMILY MEDICINE

## 2022-10-10 PROCEDURE — 99214 OFFICE O/P EST MOD 30 MIN: CPT | Mod: 25 | Performed by: FAMILY MEDICINE

## 2022-10-10 PROCEDURE — 90715 TDAP VACCINE 7 YRS/> IM: CPT | Performed by: FAMILY MEDICINE

## 2022-10-10 PROCEDURE — 90471 IMMUNIZATION ADMIN: CPT | Performed by: FAMILY MEDICINE

## 2022-10-10 PROCEDURE — 96127 BRIEF EMOTIONAL/BEHAV ASSMT: CPT | Performed by: FAMILY MEDICINE

## 2022-10-10 RX ORDER — VENLAFAXINE HYDROCHLORIDE 150 MG/1
150 CAPSULE, EXTENDED RELEASE ORAL DAILY
Qty: 90 CAPSULE | Refills: 3 | Status: SHIPPED | OUTPATIENT
Start: 2022-10-10 | End: 2023-10-20

## 2022-10-10 ASSESSMENT — ANXIETY QUESTIONNAIRES
2. NOT BEING ABLE TO STOP OR CONTROL WORRYING: NOT AT ALL
GAD7 TOTAL SCORE: 0
7. FEELING AFRAID AS IF SOMETHING AWFUL MIGHT HAPPEN: NOT AT ALL
3. WORRYING TOO MUCH ABOUT DIFFERENT THINGS: NOT AT ALL
5. BEING SO RESTLESS THAT IT IS HARD TO SIT STILL: NOT AT ALL
GAD7 TOTAL SCORE: 0
IF YOU CHECKED OFF ANY PROBLEMS ON THIS QUESTIONNAIRE, HOW DIFFICULT HAVE THESE PROBLEMS MADE IT FOR YOU TO DO YOUR WORK, TAKE CARE OF THINGS AT HOME, OR GET ALONG WITH OTHER PEOPLE: NOT DIFFICULT AT ALL
6. BECOMING EASILY ANNOYED OR IRRITABLE: NOT AT ALL
1. FEELING NERVOUS, ANXIOUS, OR ON EDGE: NOT AT ALL

## 2022-10-10 ASSESSMENT — PATIENT HEALTH QUESTIONNAIRE - PHQ9
SUM OF ALL RESPONSES TO PHQ QUESTIONS 1-9: 5
10. IF YOU CHECKED OFF ANY PROBLEMS, HOW DIFFICULT HAVE THESE PROBLEMS MADE IT FOR YOU TO DO YOUR WORK, TAKE CARE OF THINGS AT HOME, OR GET ALONG WITH OTHER PEOPLE: SOMEWHAT DIFFICULT
5. POOR APPETITE OR OVEREATING: NOT AT ALL
SUM OF ALL RESPONSES TO PHQ QUESTIONS 1-9: 5

## 2022-10-10 ASSESSMENT — ENCOUNTER SYMPTOMS
MYALGIAS: 0
SORE THROAT: 0
DIARRHEA: 0
HEADACHES: 0
ARTHRALGIAS: 0
HEMATURIA: 0
JOINT SWELLING: 0
DIZZINESS: 0
CONSTIPATION: 0
NERVOUS/ANXIOUS: 0
FEVER: 0
HEARTBURN: 0
DYSURIA: 0
NAUSEA: 0
PARESTHESIAS: 0
COUGH: 0
WEAKNESS: 0
FREQUENCY: 0
SHORTNESS OF BREATH: 0
ABDOMINAL PAIN: 0
CHILLS: 0
HEMATOCHEZIA: 0
EYE PAIN: 0
PALPITATIONS: 0

## 2022-10-10 NOTE — PROGRESS NOTES
[facility]  [place]    SUBJECTIVE    CC: Jensen is an 22 year old who presents for preventative health visit.     Patient has been advised of split billing requirements and indicates understanding: Yes     Healthy Habits:     Getting at least 3 servings of Calcium per day:  Yes    Bi-annual eye exam:  NO    Dental care twice a year:  Yes    Sleep apnea or symptoms of sleep apnea:  None    Diet:  Regular (no restrictions)    Frequency of exercise:  2-3 days/week    Duration of exercise:  15-30 minutes    Taking medications regularly:  Yes    PHQ-2 Total Score: 0    Additional concerns today:  No    Depression / Anxiety Follow-Up - PHQ = 4 and SERGIO = 0      How are you doing with your anxiety since your last visit? Improved    Are you having other symptoms that might be associated with anxiety? No    Have you had a significant life event? No     Are you feeling depressed? No    Do you have any concerns with your use of alcohol or other drugs? No    Social History     Tobacco Use     Smoking status: Never     Smokeless tobacco: Never   Vaping Use     Vaping Use: Never used   Substance Use Topics     Alcohol use: Not Currently     Alcohol/week: 0.0 - 2.0 standard drinks     Comment: 0-2 per week     Drug use: No     SERGIO-7 SCORE 1/21/2022 4/26/2022 10/10/2022   Total Score 0 (minimal anxiety) 2 (minimal anxiety) -   Total Score 0 2 0     PHQ 4/26/2022 10/10/2022 10/10/2022   PHQ-9 Total Score 3 5 4   Q9: Thoughts of better off dead/self-harm past 2 weeks Not at all Not at all Not at all   F/U: Thoughts of suicide or self-harm - - -   F/U: Self harm-plan - - -   F/U: Self-harm action - - -   F/U: Safety concerns - - -     Last PHQ-9 10/10/2022   1.  Little interest or pleasure in doing things 0   2.  Feeling down, depressed, or hopeless 0   3.  Trouble falling or staying asleep, or sleeping too much 1   4.  Feeling tired or having little energy 1   5.  Poor appetite or overeating 2   6.  Feeling bad about yourself  0   7.  Trouble concentrating 0   8.  Moving slowly or restless 0   Q9: Thoughts of better off dead/self-harm past 2 weeks 0   PHQ-9 Total Score 4   Difficulty at work, home, or with people Not difficult at all   In the past two weeks have you had thoughts of suicide or self harm? -   Do you have concerns about your personal safety or the safety of others? -   In the past 2 weeks have you thought about a plan or had intention to harm yourself? -   In the past 2 weeks have you acted on these thoughts in any way? -     SERGIO-7  10/10/2022   1. Feeling nervous, anxious, or on edge 0   2. Not being able to stop or control worrying 0   3. Worrying too much about different things 0   4. Trouble relaxing 0   5. Being so restless that it is hard to sit still 0   6. Becoming easily annoyed or irritable 0   7. Feeling afraid, as if something awful might happen 0   SERGIO-7 Total Score 0   If you checked any problems, how difficult have they made it for you to do your work, take care of things at home, or get along with other people? Not difficult at all     Today's PHQ-2 Score:   PHQ-2 ( 1999 Pfizer) 10/10/2022   Q1: Little interest or pleasure in doing things 0   Q2: Feeling down, depressed or hopeless 0   PHQ-2 Score 0   PHQ-2 Total Score (12-17 Years)- Positive if 3 or more points; Administer PHQ-A if positive -   Q1: Little interest or pleasure in doing things Not at all   Q2: Feeling down, depressed or hopeless Not at all   PHQ-2 Score 0     Abuse: Current or Past(Physical, Sexual or Emotional)- No  Do you feel safe in your environment? Yes    Have you ever done Advance Care Planning? (For example, a Health Directive, POLST, or a discussion with a medical provider or your loved ones about your wishes): No, advance care planning information given to patient to review.  Patient declined advance care planning discussion at this time.    Social History     Tobacco Use     Smoking status: Never     Smokeless tobacco: Never    Substance Use Topics     Alcohol use: Not Currently     Alcohol/week: 0.0 - 2.0 standard drinks     Comment: 0-2 per week     Alcohol Use 10/10/2022   Prescreen: >3 drinks/day or >7 drinks/week? No     Reviewed orders with patient. Reviewed health maintenance and updated orders accordingly - Yes    Reviewed and updated as needed this visit by clinical staff   Tobacco  Allergies  Meds            Reviewed and updated as needed this visit by Provider                 Review of Systems   Constitutional: Negative for chills and fever.   HENT: Negative for congestion, ear pain, hearing loss and sore throat.    Eyes: Negative for pain and visual disturbance.   Respiratory: Negative for cough and shortness of breath.    Cardiovascular: Negative for chest pain, palpitations and peripheral edema.   Gastrointestinal: Negative for abdominal pain, constipation, diarrhea, heartburn, hematochezia and nausea.   Genitourinary: Negative for dysuria, frequency, genital sores, hematuria, impotence, penile discharge and urgency.   Musculoskeletal: Negative for arthralgias, joint swelling and myalgias.   Skin: Negative for rash.   Neurological: Negative for dizziness, weakness, headaches and paresthesias.   Psychiatric/Behavioral: Negative for mood changes. The patient is not nervous/anxious.      Health Maintenance     Colonoscopy:  Due at age 44   FIT:  Due at age 40               PSA:  Due at age 40   DEXA:  NA    Health Maintenance Due   Topic Date Due     HPV IMMUNIZATION (1 - Male 2-dose series) Never done     COVID-19 Vaccine (4 - Booster for Pfizer series) 02/21/2022       Current Problem List    Patient Active Problem List   Diagnosis     Major depressive disorder, single episode, mild (H)     Anxiety     Morbid obesity (H)     CARDIOVASCULAR SCREENING; LDL GOAL LESS THAN 160       Past Medical History    Past Medical History:   Diagnosis Date     Anxiety      heart murmur congenital    asymptomatic     Major depressive  disorder, single episode, mild (H)        Past Surgical History    Past Surgical History:   Procedure Laterality Date     NO HISTORY OF SURGERY         Current Medications    Current Outpatient Medications   Medication Sig Dispense Refill     venlafaxine (EFFEXOR XR) 150 MG 24 hr capsule Take 1 capsule (150 mg) by mouth daily 90 capsule 3       Allergies    Allergies   Allergen Reactions     Sulfa Drugs Rash     Red and blotchy rash, happened when pt was a few years old     Zithromax [Azithromycin Dihydrate] Rash     Red and blotchy rash, happened when pt was a few years old.       Immunizations    Immunization History   Administered Date(s) Administered     COVID-19,PF,Moderna Booster 12/27/2021     COVID-19,PF,Pfizer (12+ Yrs) 03/17/2021, 04/07/2021     DTAP (<7y) 1999, 1999, 01/27/2000, 11/10/2000, 04/09/2004     HEPA 07/05/2011, 07/12/2013     HepB 1999, 1999, 06/07/2000     Hib (PRP-T) 1999, 1999, 01/27/2000, 11/10/2000     Influenza (H1N1) 12/10/2009     Influenza (IIV3) PF 09/30/2010     Influenza Intranasal Vaccine 10/04/2010     Influenza Vaccine IM > 6 months Valent IIV4 (Alfuria,Fluzone) 10/26/2020, 10/10/2022     Influenza Vaccine, 6+MO IM (QUADRIVALENT W/PRESERVATIVES) 12/27/2021     MMR 11/10/2000, 04/09/2004     Meningococcal (Menactra ) 07/12/2013     Pneumo Conj 13-V (2010&after) 07/09/2015     Pneumococcal (PCV 7) 05/02/2001     Poliovirus, inactivated (IPV) 1999, 1999, 11/10/2000, 04/09/2004     TDAP Vaccine (Boostrix) 07/05/2011     Tdap (Adacel,Boostrix) 07/05/2011, 10/10/2022     Varicella 11/10/2000, 07/05/2011       Family History    Family History   Problem Relation Age of Onset     Anxiety Disorder Mother      Thyroid Disease Mother      Thyroid Disease Father      Depression Maternal Grandmother         as well as other maternal side relatives     Hypertension Maternal Grandmother      Diabetes Maternal Grandfather      Coronary Artery  Disease Maternal Grandfather      Hypertension Maternal Grandfather      Hyperlipidemia Maternal Grandfather      Brain Cancer Paternal Grandfather         Skin cancer origin?     Other Cancer Paternal Grandfather      Anesthesia Reaction Paternal Grandfather      Asthma Paternal Grandfather        Social History    Social History     Socioeconomic History     Marital status: Single     Spouse name: Not on file     Number of children: 0     Years of education: Not on file     Highest education level: Not on file   Occupational History     Occupation: [employer]     Employer: STUDENT     Comment: Biology   Tobacco Use     Smoking status: Never     Smokeless tobacco: Never   Vaping Use     Vaping Use: Never used   Substance and Sexual Activity     Alcohol use: Not Currently     Alcohol/week: 0.0 - 2.0 standard drinks     Comment: 0-2 per week     Drug use: No     Sexual activity: Not Currently     Partners: Female     Birth control/protection: Condom   Other Topics Concern      Service Not Asked     Blood Transfusions Not Asked     Caffeine Concern Yes     Comment: rare     Occupational Exposure Not Asked     Hobby Hazards Not Asked     Sleep Concern Not Asked     Stress Concern Not Asked     Weight Concern Not Asked     Special Diet Not Asked     Back Care Not Asked     Exercise Yes     Comment: FB -2 hrs > 4 times per week     Bike Helmet Yes     Seat Belt Yes     Self-Exams Not Asked     Parent/sibling w/ CABG, MI or angioplasty before 65F 55M? No   Social History Narrative     Not on file     Social Determinants of Health     Financial Resource Strain: Not on file   Food Insecurity: Not on file   Transportation Needs: Not on file   Physical Activity: Not on file   Stress: Not on file   Social Connections: Not on file   Intimate Partner Violence: Not on file   Housing Stability: Not on file       ROS    CONSTITUTIONAL: NEGATIVE for fever, chills, change in weight  INTEGUMENTARY/SKIN: NEGATIVE for  "worrisome rashes, moles or lesions  EYES: NEGATIVE for vision changes or irritation  ENT/MOUTH: NEGATIVE for ear, mouth and throat problems  RESP: NEGATIVE for significant cough or SOB  BREAST: NEGATIVE for masses, tenderness or discharge  CV: NEGATIVE for chest pain, palpitations or peripheral edema  GI: NEGATIVE for nausea, abdominal pain, heartburn, or change in bowel habits  : NEGATIVE for frequency, dysuria, or hematuria  MUSCULOSKELETAL: NEGATIVE for significant arthralgias or myalgia  NEURO: NEGATIVE for weakness, dizziness or paresthesias  ENDOCRINE: NEGATIVE for temperature intolerance, skin/hair changes  HEME: NEGATIVE for bleeding problems  PSYCHIATRIC: NEGATIVE for changes in mood or affect    OBJECTIVE    /76   Pulse 101   Temp 98  F (36.7  C) (Tympanic)   Ht 1.861 m (6' 1.25\")   Wt 135.1 kg (297 lb 14.4 oz)   SpO2 98%   BMI 39.04 kg/m      EXAM:    GENERAL: healthy, alert and no distress  EYES: Eyes grossly normal to inspection, PERRL and conjunctivae and sclerae normal  HENT: ear canals and TM's normal, nose and mouth without ulcers or lesions  NECK: no adenopathy, no asymmetry, masses, or scars and thyroid normal to palpation  RESP: lungs clear to auscultation - no rales, rhonchi or wheezes  CV: regular rate and rhythm, normal S1 S2, no S3 or S4, no murmur, click or rub, no peripheral edema and peripheral pulses strong  ABDOMEN: soft, nontender, no hepatosplenomegaly, no masses and bowel sounds normal   (male): pt declines  RECTAL: at age 39  MS: no gross musculoskeletal defects noted, no edema  SKIN: no suspicious lesions or rashes  NEURO: Normal strength and tone, mentation intact and speech normal  PSYCH: mentation appears normal, affect normal/bright  LYMPH: no cervical, supraclavicular, axillary, or inguinal adenopathy    DIAGNOSTICS/PROCEDURES    Pending    ASSESSMENT      ICD-10-CM    1. Routine general medical examination at a health care facility  Z00.00 TDAP VACCINE (Adacel, " Boostrix)     INFLUENZA VACCINE IM > 6 MONTHS VALENT IIV4 (AFLURIA/FLUZONE)     Comprehensive metabolic panel     Lipid panel reflex to direct LDL Fasting     CBC with platelets     TSH with free T4 reflex     UA reflex to Microscopic and Culture      2. Major depressive disorder, single episode, mild (H)  F32.0 TSH with free T4 reflex     venlafaxine (EFFEXOR XR) 150 MG 24 hr capsule      3. Anxiety  F41.9 TSH with free T4 reflex     venlafaxine (EFFEXOR XR) 150 MG 24 hr capsule      4. CARDIOVASCULAR SCREENING; LDL GOAL LESS THAN 160  Z13.6 Lipid panel reflex to direct LDL Fasting      5. Morbid obesity (H)  E66.01 TSH with free T4 reflex      6. Medication monitoring encounter  Z51.81 Comprehensive metabolic panel     CBC with platelets      7. Need for Tdap vaccination  Z23 TDAP VACCINE (Adacel, Boostrix)      8. Need for influenza vaccination  Z23 INFLUENZA VACCINE IM > 6 MONTHS VALENT IIV4 (AFLURIA/FLUZONE)          PLAN    Discussed treatment/modality options, including risk and benefits, he desires:    advised alcohol consumption 1oz per day or less, advised 1 multivitamin per day, advised calcium 7086-1082 mg/d and Vitamin D 800-1200 IU/d, advised dentist every 6 months, advised diet, exercise, and weight loss, advised opthalmologist every 1-2 years, advised self testicular exam q month, further health care maintenance, further lab(s), immunization(s), medication refill(s), SERGIO 7, completed and reviewed and PHQ-9, Depression Action Plan, completed and reviewed    All diagnosis above reviewed and noted above, otherwise stable.      See [facility] orders for further details.      1) med refills    2) immunizations - TdaP, Flu - consider COVID bivalent/HPV    3) fasting labs    Return in about 1 year (around 10/10/2023) for Complete Physical, Medication Recheck Visit, Follow Up Chronic.    Health Maintenance Due   Topic Date Due     HPV IMMUNIZATION (1 - Male 2-dose series) Never done     COVID-19 Vaccine (4 -  "Booster for Pfizer series) 02/21/2022       COUNSELING    Reviewed preventive health counseling, as reflected in patient instructions    BP Readings from Last 1 Encounters:   10/10/22 128/76     Estimated body mass index is 39.04 kg/m  as calculated from the following:    Height as of this encounter: 1.861 m (6' 1.25\").    Weight as of this encounter: 135.1 kg (297 lb 14.4 oz).      Weight management plan: diet and exercise     reports that he has never smoked. He has never used smokeless tobacco.      Counseling Resources:    ATP IV Guidelines  Pooled Cohorts Equation Calculator  FRAX Risk Assessment  ICSI Preventive Guidelines  Dietary Guidelines for Americans, 2010  USDA's MyPlate  ASA Prophylaxis  Lung CA Screening           Lm Bear MD, FAAFP     [facility]  [address] [place] [id number]  [email or web address]   Office: [phone]  Fax: [phone]  Pager: [phone]     "

## 2022-10-14 ENCOUNTER — LAB (OUTPATIENT)
Dept: LAB | Facility: CLINIC | Age: 22
End: 2022-10-14
Payer: COMMERCIAL

## 2022-10-14 DIAGNOSIS — Z51.81 MEDICATION MONITORING ENCOUNTER: ICD-10-CM

## 2022-10-14 DIAGNOSIS — F41.9 ANXIETY: ICD-10-CM

## 2022-10-14 DIAGNOSIS — E66.01 MORBID OBESITY (H): ICD-10-CM

## 2022-10-14 DIAGNOSIS — F32.0 MAJOR DEPRESSIVE DISORDER, SINGLE EPISODE, MILD (H): ICD-10-CM

## 2022-10-14 DIAGNOSIS — Z13.6 CARDIOVASCULAR SCREENING; LDL GOAL LESS THAN 160: ICD-10-CM

## 2022-10-14 DIAGNOSIS — Z00.00 ROUTINE GENERAL MEDICAL EXAMINATION AT A HEALTH CARE FACILITY: ICD-10-CM

## 2022-10-14 LAB
ALBUMIN UR-MCNC: NEGATIVE MG/DL
APPEARANCE UR: CLEAR
BACTERIA #/AREA URNS HPF: ABNORMAL /HPF
BILIRUB UR QL STRIP: NEGATIVE
COLOR UR AUTO: YELLOW
ERYTHROCYTE [DISTWIDTH] IN BLOOD BY AUTOMATED COUNT: 11.7 % (ref 10–15)
GLUCOSE UR STRIP-MCNC: NEGATIVE MG/DL
HCT VFR BLD AUTO: 43.4 % (ref 40–53)
HGB BLD-MCNC: 15.3 G/DL (ref 13.3–17.7)
HGB UR QL STRIP: ABNORMAL
KETONES UR STRIP-MCNC: NEGATIVE MG/DL
LEUKOCYTE ESTERASE UR QL STRIP: NEGATIVE
MCH RBC QN AUTO: 30.3 PG (ref 26.5–33)
MCHC RBC AUTO-ENTMCNC: 35.3 G/DL (ref 31.5–36.5)
MCV RBC AUTO: 86 FL (ref 78–100)
NITRATE UR QL: NEGATIVE
PH UR STRIP: 6 [PH] (ref 5–7)
PLATELET # BLD AUTO: 302 10E3/UL (ref 150–450)
RBC # BLD AUTO: 5.05 10E6/UL (ref 4.4–5.9)
RBC #/AREA URNS AUTO: ABNORMAL /HPF
SP GR UR STRIP: >=1.03 (ref 1–1.03)
UROBILINOGEN UR STRIP-ACNC: 0.2 E.U./DL
WBC # BLD AUTO: 5 10E3/UL (ref 4–11)
WBC #/AREA URNS AUTO: ABNORMAL /HPF

## 2022-10-14 PROCEDURE — 84443 ASSAY THYROID STIM HORMONE: CPT

## 2022-10-14 PROCEDURE — 80053 COMPREHEN METABOLIC PANEL: CPT

## 2022-10-14 PROCEDURE — 80061 LIPID PANEL: CPT

## 2022-10-14 PROCEDURE — 81001 URINALYSIS AUTO W/SCOPE: CPT

## 2022-10-14 PROCEDURE — 84439 ASSAY OF FREE THYROXINE: CPT

## 2022-10-14 PROCEDURE — 85027 COMPLETE CBC AUTOMATED: CPT

## 2022-10-14 PROCEDURE — 36415 COLL VENOUS BLD VENIPUNCTURE: CPT

## 2022-10-16 DIAGNOSIS — E78.00 ELEVATED LDL CHOLESTEROL LEVEL: Primary | ICD-10-CM

## 2022-10-16 DIAGNOSIS — R74.01 ELEVATED ALT MEASUREMENT: ICD-10-CM

## 2022-10-16 DIAGNOSIS — R79.89 ELEVATED TSH: ICD-10-CM

## 2022-10-16 LAB
ALBUMIN SERPL-MCNC: 4 G/DL (ref 3.4–5)
ALP SERPL-CCNC: 66 U/L (ref 40–150)
ALT SERPL W P-5'-P-CCNC: 107 U/L (ref 0–70)
ANION GAP SERPL CALCULATED.3IONS-SCNC: 4 MMOL/L (ref 3–14)
AST SERPL W P-5'-P-CCNC: 41 U/L (ref 0–45)
BILIRUB SERPL-MCNC: 0.7 MG/DL (ref 0.2–1.3)
BUN SERPL-MCNC: 16 MG/DL (ref 7–30)
CALCIUM SERPL-MCNC: 8.6 MG/DL (ref 8.5–10.1)
CHLORIDE BLD-SCNC: 107 MMOL/L (ref 94–109)
CHOLEST SERPL-MCNC: 217 MG/DL
CO2 SERPL-SCNC: 25 MMOL/L (ref 20–32)
CREAT SERPL-MCNC: 0.88 MG/DL (ref 0.66–1.25)
FASTING STATUS PATIENT QL REPORTED: YES
GFR SERPL CREATININE-BSD FRML MDRD: >90 ML/MIN/1.73M2
GLUCOSE BLD-MCNC: 81 MG/DL (ref 70–99)
HDLC SERPL-MCNC: 33 MG/DL
LDLC SERPL CALC-MCNC: 162 MG/DL
NONHDLC SERPL-MCNC: 184 MG/DL
POTASSIUM BLD-SCNC: 4.1 MMOL/L (ref 3.4–5.3)
PROT SERPL-MCNC: 7.7 G/DL (ref 6.8–8.8)
SODIUM SERPL-SCNC: 136 MMOL/L (ref 133–144)
T4 FREE SERPL-MCNC: 0.76 NG/DL (ref 0.76–1.46)
TRIGL SERPL-MCNC: 111 MG/DL
TSH SERPL DL<=0.005 MIU/L-ACNC: 8.22 MU/L (ref 0.4–4)

## 2022-10-17 ENCOUNTER — MYC MEDICAL ADVICE (OUTPATIENT)
Dept: FAMILY MEDICINE | Facility: CLINIC | Age: 22
End: 2022-10-17

## 2022-10-17 DIAGNOSIS — R79.89 ELEVATED TSH: Primary | ICD-10-CM

## 2022-10-19 NOTE — TELEPHONE ENCOUNTER
Please see my chart message below     Please review and advise     Thank you     Yina Cooper RN, BSN  [facility]

## 2022-10-24 NOTE — TELEPHONE ENCOUNTER
My chart message sent     Yina Cooper RN, BSN  [facility]

## 2023-01-12 ENCOUNTER — LAB (OUTPATIENT)
Dept: LAB | Facility: CLINIC | Age: 23
End: 2023-01-12
Payer: COMMERCIAL

## 2023-01-12 DIAGNOSIS — R74.01 ELEVATED ALT MEASUREMENT: ICD-10-CM

## 2023-01-12 DIAGNOSIS — R79.89 ELEVATED TSH: ICD-10-CM

## 2023-01-12 DIAGNOSIS — E78.00 ELEVATED LDL CHOLESTEROL LEVEL: ICD-10-CM

## 2023-01-12 PROCEDURE — 36415 COLL VENOUS BLD VENIPUNCTURE: CPT

## 2023-01-12 PROCEDURE — 80061 LIPID PANEL: CPT

## 2023-01-12 PROCEDURE — 84443 ASSAY THYROID STIM HORMONE: CPT

## 2023-01-12 PROCEDURE — 84480 ASSAY TRIIODOTHYRONINE (T3): CPT

## 2023-01-12 PROCEDURE — 84439 ASSAY OF FREE THYROXINE: CPT

## 2023-01-12 PROCEDURE — 86376 MICROSOMAL ANTIBODY EACH: CPT

## 2023-01-12 PROCEDURE — 80076 HEPATIC FUNCTION PANEL: CPT

## 2023-01-13 LAB
ALBUMIN SERPL BCG-MCNC: 4.9 G/DL (ref 3.5–5.2)
ALP SERPL-CCNC: 85 U/L (ref 40–129)
ALT SERPL W P-5'-P-CCNC: 139 U/L (ref 10–50)
AST SERPL W P-5'-P-CCNC: 66 U/L (ref 10–50)
BILIRUB DIRECT SERPL-MCNC: <0.2 MG/DL (ref 0–0.3)
BILIRUB SERPL-MCNC: 0.7 MG/DL
CHOLEST SERPL-MCNC: 227 MG/DL
HDLC SERPL-MCNC: 30 MG/DL
LDLC SERPL CALC-MCNC: 166 MG/DL
NONHDLC SERPL-MCNC: 197 MG/DL
PROT SERPL-MCNC: 8.1 G/DL (ref 6.4–8.3)
T3 SERPL-MCNC: 118 NG/DL (ref 85–202)
T4 FREE SERPL-MCNC: 0.93 NG/DL (ref 0.9–1.7)
TRIGL SERPL-MCNC: 154 MG/DL
TSH SERPL DL<=0.005 MIU/L-ACNC: 4.69 UIU/ML (ref 0.3–4.2)

## 2023-01-16 LAB — THYROPEROXIDASE AB SERPL-ACNC: 1815 IU/ML

## 2023-01-20 PROBLEM — E06.3 HASHIMOTO'S THYROIDITIS: Status: ACTIVE | Noted: 2022-01-01

## 2023-01-27 DIAGNOSIS — R74.01 ELEVATED ALT MEASUREMENT: ICD-10-CM

## 2023-01-27 DIAGNOSIS — R79.89 ELEVATED TSH: Primary | ICD-10-CM

## 2023-01-29 DIAGNOSIS — R79.89 ELEVATED TSH: Primary | ICD-10-CM

## 2023-01-30 ENCOUNTER — TELEPHONE (OUTPATIENT)
Dept: ENDOCRINOLOGY | Facility: CLINIC | Age: 23
End: 2023-01-30
Payer: COMMERCIAL

## 2023-01-30 NOTE — TELEPHONE ENCOUNTER
[facility] Call Center    Phone Message    May a detailed message be left on voicemail: yes     Reason for Call: Appointment Intake    Referring Provider Name: Lm Bear MD in  FAMILY PRACTICE  Diagnosis and/or Symptoms: Elevated TSH [R79.89], patient was hoping to be seen in person in [facility] 1-2 weeks    Action Taken: Other: Endo    Travel Screening: Not Applicable

## 2023-02-02 NOTE — TELEPHONE ENCOUNTER
Attempt # 1   Called Phone # .[phone]      Left a non detailed voicemail to please call back and ask for any available triage nurse @ [phone].   wanted to verify with patient that is  Endocrine appointment 8/2/23  Daysi Devine RN Scott, Troy, MD 2 days ago     DM  Thank you for your referral. Per our clinic triage protocol, based on the most recent thyroid lab results submitted with the referral, the initial visit is scheduled on Aug 02, 2023.     Daysi Devine RN on 1/31/2023 at 7:48 AM         Tatiana EARLY RN   [facility] Triage

## 2023-02-03 NOTE — TELEPHONE ENCOUNTER
patient called back and was advised to keep the 8/2023 appointment     Advised to call back if he has concerns or worsening symptoms before appointment     Tatiana EARLY RN   [facility] Triage

## 2023-06-29 ENCOUNTER — OFFICE VISIT (OUTPATIENT)
Dept: ENDOCRINOLOGY | Facility: CLINIC | Age: 23
End: 2023-06-29
Attending: FAMILY MEDICINE
Payer: COMMERCIAL

## 2023-06-29 VITALS
HEIGHT: 73 IN | WEIGHT: 283 LBS | HEART RATE: 82 BPM | RESPIRATION RATE: 18 BRPM | DIASTOLIC BLOOD PRESSURE: 66 MMHG | BODY MASS INDEX: 37.51 KG/M2 | TEMPERATURE: 98.4 F | OXYGEN SATURATION: 99 % | SYSTOLIC BLOOD PRESSURE: 114 MMHG

## 2023-06-29 DIAGNOSIS — E03.8 SUBCLINICAL HYPOTHYROIDISM: Primary | ICD-10-CM

## 2023-06-29 DIAGNOSIS — R76.8 ANTI-TPO ANTIBODIES PRESENT: ICD-10-CM

## 2023-06-29 PROCEDURE — 99203 OFFICE O/P NEW LOW 30 MIN: CPT | Performed by: INTERNAL MEDICINE

## 2023-06-29 PROCEDURE — 84481 FREE ASSAY (FT-3): CPT | Performed by: INTERNAL MEDICINE

## 2023-06-29 PROCEDURE — 36415 COLL VENOUS BLD VENIPUNCTURE: CPT | Performed by: INTERNAL MEDICINE

## 2023-06-29 PROCEDURE — 84439 ASSAY OF FREE THYROXINE: CPT | Performed by: INTERNAL MEDICINE

## 2023-06-29 PROCEDURE — 84443 ASSAY THYROID STIM HORMONE: CPT | Performed by: INTERNAL MEDICINE

## 2023-06-29 ASSESSMENT — ENCOUNTER SYMPTOMS
FATIGUE: 1
HALLUCINATIONS: 0
POLYDIPSIA: 0
POLYPHAGIA: 0
ALTERED TEMPERATURE REGULATION: 1
WEIGHT GAIN: 0
WEIGHT LOSS: 0
INCREASED ENERGY: 0
CHILLS: 1
NIGHT SWEATS: 1
FEVER: 0
DECREASED APPETITE: 0

## 2023-06-29 NOTE — PROGRESS NOTES
ENDOCRINOLOGY CLINIC NOTE:    Name: John Paul Durham  Seen in consultation with Lm Bear for Subclinical Hypothyroidism.  HPI:  John Paul Durham is a 22 year old male who presents for the evaluation of Subclinical Hypothyroidism (+TPO):    #1.Subclinical Hypothyroidism (+TPO):  Routine lab check 10/2022 consistent with subclinical hypothyroidism with TSH 0.22 and normal free T4.  Repeat labs 1/2023 again consistent with subclinical hypothyroidism but TSH improved.  Strongly positive TPO antibodies.  Strong family history of Hashimoto in mother and brother--on medication  He has never been on thyroid hormone replacement before.    Feeling tired X more in last 6 months    Palpitations:  No  Changes to hair or skin: No  Diarrhea/Constipation:No  Changes in vision:No  Dysphagia or Shortness of breath:No  Muscle aches or pain:No  Tremors:No  Changes in weight: Yes: lost 15-20 lbs in 6 months- was trying to loose weight  Heat or cold intolerance: feels cold  History of Lithium or Amiodarone use:No  Head or neck surgery/radiation:No  Family History of Thyroid Problems: Mother and brother with Hashimoto--on medication  Wt Readings from Last 2 Encounters:   10/10/22 135.1 kg (297 lb 14.4 oz)   07/13/22 132.2 kg (291 lb 6.4 oz)      PMH/PSH:  Past Medical History:   Diagnosis Date     Anxiety      Hashimoto's thyroiditis 2022     heart murmur congenital    asymptomatic     Major depressive disorder, single episode, mild (H)      Past Surgical History:   Procedure Laterality Date     NO HISTORY OF SURGERY       Family Hx:  Family History   Problem Relation Age of Onset     Anxiety Disorder Mother      Thyroid Disease Mother      Thyroid Disease Father      Depression Maternal Grandmother         as well as other maternal side relatives     Hypertension Maternal Grandmother      Diabetes Maternal Grandfather      Coronary Artery Disease Maternal Grandfather      Hypertension Maternal Grandfather      Hyperlipidemia Maternal  Grandfather      Brain Cancer Paternal Grandfather         Skin cancer origin?     Other Cancer Paternal Grandfather      Anesthesia Reaction Paternal Grandfather      Asthma Paternal Grandfather      Social Hx:  Social History     Socioeconomic History     Marital status: Single     Spouse name: Not on file     Number of children: 0     Years of education: Not on file     Highest education level: Not on file   Occupational History     Occupation: [employer]     Employer: STUDENT     Comment: Biology   Tobacco Use     Smoking status: Never     Smokeless tobacco: Never   Vaping Use     Vaping Use: Never used   Substance and Sexual Activity     Alcohol use: Not Currently     Alcohol/week: 0.0 - 2.0 standard drinks of alcohol     Comment: 0-2 per week     Drug use: No     Sexual activity: Not Currently     Partners: Female     Birth control/protection: Condom   Other Topics Concern      Service Not Asked     Blood Transfusions Not Asked     Caffeine Concern Yes     Comment: rare     Occupational Exposure Not Asked     Hobby Hazards Not Asked     Sleep Concern Not Asked     Stress Concern Not Asked     Weight Concern Not Asked     Special Diet Not Asked     Back Care Not Asked     Exercise Yes     Comment: FB -2 hrs > 4 times per week     Bike Helmet Yes     Seat Belt Yes     Self-Exams Not Asked     Parent/sibling w/ CABG, MI or angioplasty before 65F 55M? No   Social History Narrative     Not on file     Social Determinants of Health     Financial Resource Strain: Not on file   Food Insecurity: Not on file   Transportation Needs: Not on file   Physical Activity: Not on file   Stress: Not on file   Social Connections: Not on file   Intimate Partner Violence: Not on file   Housing Stability: Not on file          MEDICATIONS:  has a current medication list which includes the following prescription(s): venlafaxine.    ROS   ROS: 10 point ROS neg other than the symptoms noted above in the HPI.    Physical Exam    VS: There were no vitals taken for this visit.  GENERAL: healthy, alert and no distress  EYES: Eyes grossly normal to inspection, conjunctivae and sclerae normal  ENT: no nose swelling, nasal discharge.  Thyroid: no apparent thyroid nodules.  Thyroid appears normal in size and nontender.  CV: RRR, no rubs, gallops, no murmurs  RESP: CTAB, no wheezes, rales, or ronchi  ABDO: +BS  EXTREMITIES: no hand tremors.  NEURO: Cranial nerves grossly intact, mentation intact and speech normal  SKIN: No apparent skin lesions, rash or edema seen   PSYCH: mentation appears normal, affect normal/bright, judgement and insight intact, normal speech and appearance well-groomed      LABS:  TFTs:   Latest Ref Rng 1/12/2023  3:35 PM   ENDO THYROID LABS-[facility]     TSH 0.30 - 4.20 uIU/mL 4.69 (H)    Triiodothyronine (T3) 85 - 202 ng/dL 118    FREE T4 0.90 - 1.70 ng/dL 0.93    Thyroid Peroxidase Antibody <35 IU/mL 1,815 (H)         All pertinent notes, labs, and images personally reviewed by me.     A/P  Mr.Zachary KRYSTIN Durham is a 22 year old here for the evaluation of hypothyroidism:    #1.subclinical hypothyroidism (+TPO):  Labs consistent with subclinical hypothyroidism.  Strong family history of Hashimoto  Not on medication at this time.  Plan  Discussed diagnosis, pathophysiology, management and treatment options of condition with pt.  Discussed subclinical hypothyroidism in general and guidelines about treating subclinical hypothyroidism.  Discussed that fatigue can be multifactorial.  Recommend repeat lab check.  Consider medication based on labs.  If labs are consistent with subclinical hypothyroidism then plan to continue to hold off medication  Recommend close follow-up with repeat labs 6/12 months or sooner if concerns.    Discussed s/s of hypothyroidism and hyperthyroidism to watch for.  The patient indicates understanding of these issues and agrees with the plan.    Follow-up:  As noted in AVS    Melissa Day,  MD  Endocrinology  [facility]  CC: Lm Bear      All questions were answered.  The patient indicates understanding of the above issues and agrees with the plan set forth.     Answers for HPI/ROS submitted by the patient on 6/29/2023  General Symptoms: Yes  Skin Symptoms: No  HENT Symptoms: No  EYE SYMPTOMS: No  HEART SYMPTOMS: No  LUNG SYMPTOMS: No  INTESTINAL SYMPTOMS: No  URINARY SYMPTOMS: No  REPRODUCTIVE SYMPTOMS: No  SKELETAL SYMPTOMS: No  BLOOD SYMPTOMS: No  NERVOUS SYSTEM SYMPTOMS: No  MENTAL HEALTH SYMPTOMS: No  Fever: No  Loss of appetite: No  Weight loss: No  Weight gain: No  Fatigue: Yes  Night sweats: Yes  Chills: Yes  Increased stress: No  Excessive hunger: No  Excessive thirst: No  Feeling hot or cold when others believe the temperature is normal: Yes  Loss of height: No  Post-operative complications: No  Surgical site pain: No  Hallucinations: No  Change in or Loss of Energy: No  Hyperactivity: No  Confusion: No

## 2023-06-29 NOTE — PATIENT INSTRUCTIONS
-[facility]  Dr Day, Endocrinology Department    [place] [id number]  [place] [address]  Appointment [phone]  Fax: [phone]  [place]: Monday - Thursday      Labs today  Consider medication based on that

## 2023-06-29 NOTE — LETTER
6/29/2023         RE: John Paul Durham  [address]  [place] [address]        Dear Colleague,    Thank you for referring your patient, John Paul Durham, to the [facility]. Please see a copy of my visit note below.    ENDOCRINOLOGY CLINIC NOTE:    Name: John Paul Durham  Seen in consultation with Lm Bear for Subclinical Hypothyroidism.  HPI:  John Paul Durham is a 22 year old male who presents for the evaluation of Subclinical Hypothyroidism (+TPO):    #1.Subclinical Hypothyroidism (+TPO):  Routine lab check 10/2022 consistent with subclinical hypothyroidism with TSH 0.22 and normal free T4.  Repeat labs 1/2023 again consistent with subclinical hypothyroidism but TSH improved.  Strongly positive TPO antibodies.  Strong family history of Hashimoto in mother and brother--on medication  He has never been on thyroid hormone replacement before.    Feeling tired X more in last 6 months    Palpitations:  No  Changes to hair or skin: No  Diarrhea/Constipation:No  Changes in vision:No  Dysphagia or Shortness of breath:No  Muscle aches or pain:No  Tremors:No  Changes in weight: Yes: lost 15-20 lbs in 6 months- was trying to loose weight  Heat or cold intolerance: feels cold  History of Lithium or Amiodarone use:No  Head or neck surgery/radiation:No  Family History of Thyroid Problems: Mother and brother with Hashimoto--on medication  Wt Readings from Last 2 Encounters:   10/10/22 135.1 kg (297 lb 14.4 oz)   07/13/22 132.2 kg (291 lb 6.4 oz)      PMH/PSH:  Past Medical History:   Diagnosis Date     Anxiety      Hashimoto's thyroiditis 2022     heart murmur congenital    asymptomatic     Major depressive disorder, single episode, mild (H)      Past Surgical History:   Procedure Laterality Date     NO HISTORY OF SURGERY       Family Hx:  Family History   Problem Relation Age of Onset     Anxiety Disorder Mother      Thyroid Disease Mother      Thyroid Disease Father      Depression Maternal  Grandmother         as well as other maternal side relatives     Hypertension Maternal Grandmother      Diabetes Maternal Grandfather      Coronary Artery Disease Maternal Grandfather      Hypertension Maternal Grandfather      Hyperlipidemia Maternal Grandfather      Brain Cancer Paternal Grandfather         Skin cancer origin?     Other Cancer Paternal Grandfather      Anesthesia Reaction Paternal Grandfather      Asthma Paternal Grandfather      Social Hx:  Social History     Socioeconomic History     Marital status: Single     Spouse name: Not on file     Number of children: 0     Years of education: Not on file     Highest education level: Not on file   Occupational History     Occupation: [employer]     Employer: STUDENT     Comment: Biology   Tobacco Use     Smoking status: Never     Smokeless tobacco: Never   Vaping Use     Vaping Use: Never used   Substance and Sexual Activity     Alcohol use: Not Currently     Alcohol/week: 0.0 - 2.0 standard drinks of alcohol     Comment: 0-2 per week     Drug use: No     Sexual activity: Not Currently     Partners: Female     Birth control/protection: Condom   Other Topics Concern      Service Not Asked     Blood Transfusions Not Asked     Caffeine Concern Yes     Comment: rare     Occupational Exposure Not Asked     Hobby Hazards Not Asked     Sleep Concern Not Asked     Stress Concern Not Asked     Weight Concern Not Asked     Special Diet Not Asked     Back Care Not Asked     Exercise Yes     Comment: FB -2 hrs > 4 times per week     Bike Helmet Yes     Seat Belt Yes     Self-Exams Not Asked     Parent/sibling w/ CABG, MI or angioplasty before 65F 55M? No   Social History Narrative     Not on file     Social Determinants of Health     Financial Resource Strain: Not on file   Food Insecurity: Not on file   Transportation Needs: Not on file   Physical Activity: Not on file   Stress: Not on file   Social Connections: Not on file   Intimate Partner Violence:  Not on file   Housing Stability: Not on file          MEDICATIONS:  has a current medication list which includes the following prescription(s): venlafaxine.    ROS   ROS: 10 point ROS neg other than the symptoms noted above in the HPI.    Physical Exam   VS: There were no vitals taken for this visit.  GENERAL: healthy, alert and no distress  EYES: Eyes grossly normal to inspection, conjunctivae and sclerae normal  ENT: no nose swelling, nasal discharge.  Thyroid: no apparent thyroid nodules.  Thyroid appears normal in size and nontender.  CV: RRR, no rubs, gallops, no murmurs  RESP: CTAB, no wheezes, rales, or ronchi  ABDO: +BS  EXTREMITIES: no hand tremors.  NEURO: Cranial nerves grossly intact, mentation intact and speech normal  SKIN: No apparent skin lesions, rash or edema seen   PSYCH: mentation appears normal, affect normal/bright, judgement and insight intact, normal speech and appearance well-groomed      LABS:  TFTs:   Latest Ref Rng 1/12/2023  3:35 PM   ENDO THYROID LABS-[facility]     TSH 0.30 - 4.20 uIU/mL 4.69 (H)    Triiodothyronine (T3) 85 - 202 ng/dL 118    FREE T4 0.90 - 1.70 ng/dL 0.93    Thyroid Peroxidase Antibody <35 IU/mL 1,815 (H)         All pertinent notes, labs, and images personally reviewed by me.     A/P  Mr.Zachary KRYSTIN Durham is a 22 year old here for the evaluation of hypothyroidism:    #1.subclinical hypothyroidism (+TPO):  Labs consistent with subclinical hypothyroidism.  Strong family history of Hashimoto  Not on medication at this time.  Plan  Discussed diagnosis, pathophysiology, management and treatment options of condition with pt.  Discussed subclinical hypothyroidism in general and guidelines about treating subclinical hypothyroidism.  Discussed that fatigue can be multifactorial.  Recommend repeat lab check.  Consider medication based on labs.  If labs are consistent with subclinical hypothyroidism then plan to continue to hold off medication  Recommend close follow-up with repeat  labs 6/12 months or sooner if concerns.    Discussed s/s of hypothyroidism and hyperthyroidism to watch for.  The patient indicates understanding of these issues and agrees with the plan.    Follow-up:  As noted in AVS    Melissa Day MD  Endocrinology  [facility]  CC: Lm Bear      All questions were answered.  The patient indicates understanding of the above issues and agrees with the plan set forth.     Answers for HPI/ROS submitted by the patient on 6/29/2023  General Symptoms: Yes  Skin Symptoms: No  HENT Symptoms: No  EYE SYMPTOMS: No  HEART SYMPTOMS: No  LUNG SYMPTOMS: No  INTESTINAL SYMPTOMS: No  URINARY SYMPTOMS: No  REPRODUCTIVE SYMPTOMS: No  SKELETAL SYMPTOMS: No  BLOOD SYMPTOMS: No  NERVOUS SYSTEM SYMPTOMS: No  MENTAL HEALTH SYMPTOMS: No  Fever: No  Loss of appetite: No  Weight loss: No  Weight gain: No  Fatigue: Yes  Night sweats: Yes  Chills: Yes  Increased stress: No  Excessive hunger: No  Excessive thirst: No  Feeling hot or cold when others believe the temperature is normal: Yes  Loss of height: No  Post-operative complications: No  Surgical site pain: No  Hallucinations: No  Change in or Loss of Energy: No  Hyperactivity: No  Confusion: No          Again, thank you for allowing me to participate in the care of your patient.        Sincerely,        Melissa Day MD

## 2023-06-30 LAB
T3FREE SERPL-MCNC: 3.6 PG/ML (ref 2–4.4)
T4 FREE SERPL-MCNC: 0.95 NG/DL (ref 0.9–1.7)
TSH SERPL DL<=0.005 MIU/L-ACNC: 8.56 UIU/ML (ref 0.3–4.2)

## 2023-07-06 ENCOUNTER — MYC MEDICAL ADVICE (OUTPATIENT)
Dept: ENDOCRINOLOGY | Facility: CLINIC | Age: 23
End: 2023-07-06
Payer: COMMERCIAL

## 2023-07-06 DIAGNOSIS — E03.8 SUBCLINICAL HYPOTHYROIDISM: Primary | ICD-10-CM

## 2023-07-06 NOTE — TELEPHONE ENCOUNTER
Per lab result comments :    Noted rise in TS. Free T4 levels are in range.  Based in rise in TSH thyroid hormone replacement can be considered.  Please let me know our thoughts and I can send a Rx

## 2023-07-09 RX ORDER — LEVOTHYROXINE SODIUM 25 UG/1
25 TABLET ORAL DAILY
Qty: 90 TABLET | Refills: 1 | Status: SHIPPED | OUTPATIENT
Start: 2023-07-09 | End: 2023-10-17

## 2023-07-10 NOTE — TELEPHONE ENCOUNTER
Recommend to start levothyroxine 25 mcg/day.  Labs in 3 months.  Please make a lab appointment for blood work and follow up clinic appointment in 1 week after that to discuss results.    Please inform patient and help schedule virtual (video preferred)/ clinic visit.    Take Levothyroxine on an empty stomach. Take it with a full glass of water at least 30 minutes to 1 hour before eating breakfast.   This medicine should be taken at least 4 hours before or 4 hours after these medicines: antacids (Maalox , Mylanta , Tums ), calcium supplements, cholestyramine (Prevalite , Questran ), colestipol (Colestid ), iron supplements, orlistat (Frank , Xenical ), simethicone (Gas-X , Mylicon ), and sucralfate (Carafate ).   Swallow the capsule whole. Do not cut or crush it.      Latest Ref Rng 6/29/2023  10:30 AM   ENDO THYROID LABS-[facility]     TSH 0.30 - 4.20 uIU/mL 8.56 (H)    Free T3 2.0 - 4.4 pg/mL 3.6    Triiodothyronine (T3) 85 - 202 ng/dL    FREE T4 0.90 - 1.70 ng/dL 0.95

## 2023-07-31 ENCOUNTER — ANCILLARY PROCEDURE (OUTPATIENT)
Dept: GENERAL RADIOLOGY | Facility: CLINIC | Age: 23
End: 2023-07-31
Attending: FAMILY MEDICINE
Payer: COMMERCIAL

## 2023-07-31 ENCOUNTER — OFFICE VISIT (OUTPATIENT)
Dept: FAMILY MEDICINE | Facility: CLINIC | Age: 23
End: 2023-07-31
Payer: COMMERCIAL

## 2023-07-31 VITALS
WEIGHT: 275 LBS | RESPIRATION RATE: 17 BRPM | DIASTOLIC BLOOD PRESSURE: 84 MMHG | TEMPERATURE: 97 F | OXYGEN SATURATION: 100 % | SYSTOLIC BLOOD PRESSURE: 122 MMHG | HEIGHT: 74 IN | HEART RATE: 58 BPM | BODY MASS INDEX: 35.29 KG/M2

## 2023-07-31 DIAGNOSIS — S93.401A SPRAIN OF RIGHT ANKLE, UNSPECIFIED LIGAMENT, INITIAL ENCOUNTER: ICD-10-CM

## 2023-07-31 DIAGNOSIS — E66.01 MORBID OBESITY (H): ICD-10-CM

## 2023-07-31 DIAGNOSIS — F32.0 MAJOR DEPRESSIVE DISORDER, SINGLE EPISODE, MILD (H): ICD-10-CM

## 2023-07-31 DIAGNOSIS — S93.401A SPRAIN OF RIGHT ANKLE, UNSPECIFIED LIGAMENT, INITIAL ENCOUNTER: Primary | ICD-10-CM

## 2023-07-31 PROCEDURE — 73610 X-RAY EXAM OF ANKLE: CPT | Mod: TC | Performed by: RADIOLOGY

## 2023-07-31 PROCEDURE — 99213 OFFICE O/P EST LOW 20 MIN: CPT | Performed by: FAMILY MEDICINE

## 2023-07-31 ASSESSMENT — PATIENT HEALTH QUESTIONNAIRE - PHQ9
SUM OF ALL RESPONSES TO PHQ QUESTIONS 1-9: 3
10. IF YOU CHECKED OFF ANY PROBLEMS, HOW DIFFICULT HAVE THESE PROBLEMS MADE IT FOR YOU TO DO YOUR WORK, TAKE CARE OF THINGS AT HOME, OR GET ALONG WITH OTHER PEOPLE: NOT DIFFICULT AT ALL
SUM OF ALL RESPONSES TO PHQ QUESTIONS 1-9: 3

## 2023-07-31 ASSESSMENT — PAIN SCALES - GENERAL: PAINLEVEL: MODERATE PAIN (5)

## 2023-07-31 NOTE — PROGRESS NOTES
"  Assessment & Plan     Sprain of right ankle, unspecified ligament, initial encounter  2 months ago, twisted while playing softball, was able to walk but having pain and swelling with bruising, pain aggravates at inversion and eversion rotation   Xr showed no fracture per my reading, the results were updated to pt directly   - XR Ankle Right G/E 3 Views; Future           BMI:   Estimated body mass index is 35.67 kg/m  as calculated from the following:    Height as of this encounter: 1.87 m (6' 1.62\").    Weight as of this encounter: 124.7 kg (275 lb).   Weight management plan: Discussed healthy diet and exercise guidelines    FUTURE APPOINTMENTS:       - Follow-up visit in 6 weeks if not improving     Henry Marcelo MD  [facility] ANILA Styles is a 23 year old, presenting for the following health issues:  Foot Injury (Right ankle )        7/31/2023     1:45 PM   Additional Questions   Roomed by Linnea BRANCH       Foot Injury         Pain History:  When did you first notice your pain? 2 months ago    Have you seen this provider for your pain in the past? No   Where in your body do your have pain? Right ankle  Are you seeing anyone else for your pain? No  What makes your pain better? Ibuprofen   What makes your pain worse? Walking  How has pain affected your ability to work? 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 Lives alone         10/10/2022     7:19 AM 10/10/2022     4:05 PM 7/31/2023     9:44 AM   PHQ-9 SCORE   PHQ-9 Total Score MyChart 5 (Mild depression)  3 (Minimal depression)   PHQ-9 Total Score 5 4 3           1/21/2022     8:25 AM 4/26/2022     6:53 AM 10/10/2022     4:05 PM   SERGIO-7 SCORE   Total Score 0 (minimal anxiety) 2 (minimal anxiety)    Total Score 0 2 0         Chronic Pain - Initial Assessment:    How would you describe your pain? sharp  Have you had any recent changes to the " "severity or character of your pain? none  Is there an underlying cause that has been identified? Twisted   Has your ability to work or do daily activities changed recently because of your pain? no              Review of Systems   Constitutional, HEENT, cardiovascular, pulmonary, gi and gu systems are negative, except as otherwise noted.      Objective    /84   Pulse 58   Temp 97  F (36.1  C) (Tympanic)   Resp 17   Ht 1.87 m (6' 1.62\")   Wt 124.7 kg (275 lb)   SpO2 100%   BMI 35.67 kg/m    Body mass index is 35.67 kg/m .  Physical Exam   GENERAL: healthy, alert and no distress  NECK: no adenopathy, no asymmetry, masses, or scars and thyroid normal to palpation  RESP: lungs clear to auscultation - no rales, rhonchi or wheezes  CV: regular rate and rhythm, normal S1 S2, no S3 or S4, no murmur, click or rub, no peripheral edema and peripheral pulses strong  ABDOMEN: soft, nontender, no hepatosplenomegaly, no masses and bowel sounds normal  MS: ankle right-mildly swelling and tender at pressure, anterior drawer tests negative, talar tilt positive,             Answers submitted by the patient for this visit:  Patient Health Questionnaire (Submitted on 7/31/2023)  If you checked off any problems, how difficult have these problems made it for you to do your work, take care of things at home, or get along with other people?: Not difficult at all  PHQ9 TOTAL SCORE: 3    "

## 2023-09-06 ENCOUNTER — THERAPY VISIT (OUTPATIENT)
Dept: PHYSICAL THERAPY | Facility: CLINIC | Age: 23
End: 2023-09-06
Payer: COMMERCIAL

## 2023-09-06 DIAGNOSIS — S93.401S SPRAIN OF RIGHT ANKLE, UNSPECIFIED LIGAMENT, SEQUELA: Primary | ICD-10-CM

## 2023-09-06 DIAGNOSIS — S93.401A SPRAIN OF RIGHT ANKLE, UNSPECIFIED LIGAMENT, INITIAL ENCOUNTER: ICD-10-CM

## 2023-09-06 PROCEDURE — 97110 THERAPEUTIC EXERCISES: CPT | Mod: GP | Performed by: PHYSICAL THERAPIST

## 2023-09-06 PROCEDURE — 97161 PT EVAL LOW COMPLEX 20 MIN: CPT | Mod: GP | Performed by: PHYSICAL THERAPIST

## 2023-09-06 NOTE — PROGRESS NOTES
PHYSICAL THERAPY EVALUATION  Type of Visit: Evaluation    See electronic medical record for Abuse and Falls Screening details.    Subjective       Presenting condition or subjective complaint: Ankle sprain  Date of onset: 06/06/23    Relevant medical history:     Dates & types of surgery:      Prior diagnostic imaging/testing results: X-ray     Prior therapy history for the same diagnosis, illness or injury: No      Prior Level of Function  Transfers:   Ambulation:   ADL:   IADL:     Living Environment  Social support: Alone   Type of home: [facility]   Stairs to enter the home: No       Ramp: No   Stairs inside the home: No       Help at home: None  Equipment owned:       Employment: Yes  at manufacturing company  Hobbies/Interests: Walking my dog, softball, other sports    Patient goals for therapy: Walk normally, move without pain    Pain assessment:      Objective   FOOT/ANKLE EVALUATION  PAIN: Pain Level at Rest: 4/10  Pain Level with Use: 5/10  Pain Location: lateral/medial ankle, arch of foot, and a little at achilles (that has gotten better).  Also now getting some R knee pain (medial).  Pain Quality: Aching and Sharp  Pain Frequency: intermittent  Pain is Worst: just with weightbearing activities  Pain is Exacerbated By: walking (especially down or uphill), running, softball.  Pain is Relieved By: ibuprofen  Pain Progression: Unchanged  INTEGUMENTARY (edema, incisions):   POSTURE:   Pt wears inserts - has had plantar fasciitis before.  Current pain feels different - hurts all the time now versus just when getting up before.  GAIT:   Weightbearing Status:   Assistive Device(s):   Gait Deviations: WNL  BALANCE/PROPRIOCEPTION:   WEIGHT BEARING ALIGNMENT:  pes planus B  NON-WEIGHTBEARING ALIGNMENT:    ROM:   (Degrees) Left AROM Left PROM  Right AROM Right PROM   Ankle Dorsiflexion 10  -5 (+ anterior/lateralankle) 4   Ankle Plantarflexion 55  55 (+ lateral ankle)    Ankle Inversion 40  20 (+  at medial ankle)    Ankle Eversion 20  20 (+ lateral ankle)    Great Toe Flexion       Great Toe Extension       Pain:   End feel:     STRENGTH:   Pain: - none + mild ++ moderate +++ severe  Strength Scale: 0-5/5 Left Right   Ankle Dorsiflexion 5 5- (+)   Ankle Plantarflexion 5 5- (++ at achilles and plantar fascia)   Ankle Inversion     Ankle Eversion     Great Toe Flexion     Great Toe Extension     Anterior Tibialis 5 5   Posterior Tibialis 5  5 (+)   Peroneals 5 5   Extensor Digitorum 5 4- (++)   Gastroc/Soleus       KNEE STRENGTH  MMT B knee ext 5/5, R knee flex 5/5  (+ in joint), L Knee flex 5/5, B hip IR 5/5, B hip ER 5/5.  FLEXIBILITY:   SPECIAL TESTS:  Normal testing of ligaments at R ankle and at R knee  FUNCTIONAL TESTS:   PALPATION:   JOINT MOBILITY:     Assessment & Plan   CLINICAL IMPRESSIONS  Medical Diagnosis: Sprain of right ankle, unspecified ligament, initial encounter    Treatment Diagnosis: Sprain of right ankle, unspecified ligament   Impression/Assessment: Patient is a 23 year old male with R ankle pain complaints.  The following significant findings have been identified: Pain, Decreased ROM/flexibility, Decreased strength, Edema, Impaired muscle performance, and Decreased activity tolerance. These impairments interfere with their ability to perform recreational activities, household chores, household mobility, and community mobility as compared to previous level of function.     Clinical Decision Making (Complexity):  Clinical Presentation: Stable/Uncomplicated  Clinical Presentation Rationale: based on medical and personal factors listed in PT evaluation  Clinical Decision Making (Complexity): Low complexity    PLAN OF CARE  Treatment Interventions:  Interventions: Neuromuscular Re-education, Therapeutic Activity, Therapeutic Exercise    Long Term Goals     PT Goal 1  Goal Identifier: Ambulation  Goal Description: Pt will be able to walk without limitation in distance with 0-1/10 concordant  R ankle pain.  Rationale: to maximize safety and independence within the community  Target Date: 11/29/23      Frequency of Treatment: 2x/month  Duration of Treatment: 3 months    Recommended Referrals to Other Professionals:   Education Assessment:   Learner/Method: No Barriers to Learning    Risks and benefits of evaluation/treatment have been explained.   Patient/Family/caregiver agrees with Plan of Care.     Evaluation Time:     PT Eval, Low Complexity Minutes (63679): 15       Signing Clinician: Yina Lam PT

## 2023-09-11 ENCOUNTER — PATIENT OUTREACH (OUTPATIENT)
Dept: CARE COORDINATION | Facility: CLINIC | Age: 23
End: 2023-09-11
Payer: COMMERCIAL

## 2023-09-25 ENCOUNTER — PATIENT OUTREACH (OUTPATIENT)
Dept: CARE COORDINATION | Facility: CLINIC | Age: 23
End: 2023-09-25
Payer: COMMERCIAL

## 2023-10-11 ENCOUNTER — LAB (OUTPATIENT)
Dept: LAB | Facility: CLINIC | Age: 23
End: 2023-10-11
Payer: COMMERCIAL

## 2023-10-11 DIAGNOSIS — E03.8 SUBCLINICAL HYPOTHYROIDISM: ICD-10-CM

## 2023-10-11 LAB
T3FREE SERPL-MCNC: 3.9 PG/ML (ref 2–4.4)
T4 FREE SERPL-MCNC: 1.16 NG/DL (ref 0.9–1.7)
TSH SERPL DL<=0.005 MIU/L-ACNC: 3.95 UIU/ML (ref 0.3–4.2)

## 2023-10-11 PROCEDURE — 36415 COLL VENOUS BLD VENIPUNCTURE: CPT

## 2023-10-11 PROCEDURE — 84439 ASSAY OF FREE THYROXINE: CPT

## 2023-10-11 PROCEDURE — 84481 FREE ASSAY (FT-3): CPT

## 2023-10-11 PROCEDURE — 84443 ASSAY THYROID STIM HORMONE: CPT

## 2023-10-17 ENCOUNTER — VIRTUAL VISIT (OUTPATIENT)
Dept: ENDOCRINOLOGY | Facility: CLINIC | Age: 23
End: 2023-10-17
Payer: COMMERCIAL

## 2023-10-17 VITALS — WEIGHT: 270 LBS | BODY MASS INDEX: 35.78 KG/M2 | HEIGHT: 73 IN

## 2023-10-17 DIAGNOSIS — E03.8 SUBCLINICAL HYPOTHYROIDISM: ICD-10-CM

## 2023-10-17 PROCEDURE — 99213 OFFICE O/P EST LOW 20 MIN: CPT | Mod: VID | Performed by: INTERNAL MEDICINE

## 2023-10-17 RX ORDER — LEVOTHYROXINE SODIUM 25 UG/1
25 TABLET ORAL DAILY
Qty: 90 TABLET | Refills: 3 | Status: SHIPPED | OUTPATIENT
Start: 2023-10-17 | End: 2024-02-20

## 2023-10-17 ASSESSMENT — PAIN SCALES - GENERAL: PAINLEVEL: NO PAIN (0)

## 2023-10-17 NOTE — PATIENT INSTRUCTIONS
[facility]  Dr Day, Endocrinology Department    [place] [address] [id number]  [place] [address]  Appointment [phone]  Fax: [phone]  [place]: Monday - Thursday      Thyroid labs are in acceptable range.  Continue current dose of thyroid hormone replacement-- levothyroxine 25 mcg/day.  Repeat labs and follow up in 1 year or sooner if concerns.  Please make a lab appointment for blood work and follow up clinic appointment in 1 week after that to discuss results.

## 2023-10-17 NOTE — PROGRESS NOTES
"THIS IS A VIDEO VISIT:    Phone call visit/virtual visit encounter:    Name of patient: John Paul Durham    Date of encounter: 10/17/2023    Time of start of video visit: 10:30    Video started: 10:40    Video ended: 10:45    Provider location: working from home/ [facility]    Patient location: patients home.    Mode of transmission: [employer] video/ [employer]    Verbal consent: obtained before starting visit. Pt is agreeable.      The patient has been notified of following:      \"This VIDEO visit will be conducted via a call between you and your physician/provider. We have found that certain health care needs can be provided without the need for a physical exam.  This service lets us provide the care you need with a short phone conversation.  If a prescription is necessary we can send it directly to your pharmacy.  If lab work is needed we can place an order for that and you can then stop by our lab to have the test done at a later time.     With new updates with corona virus patient might be billed as clinic visit.     If during the course of the call the physician/provider feels a telephone visit is not appropriate, you will not be charged for this service.\"      Past medical history, social history, family history, allergy and medications were reviewed and updated as appropriate.  Reviewed pertinent labs, notes, imaging studies personally.    ENDOCRINOLOGY CLINIC NOTE:    Name: John Paul Durham  Seen for follow up of Subclinical Hypothyroidism.  HPI:  John Paul Durham is a 23 year old male who presents for the follow up evaluation of Subclinical Hypothyroidism (+TPO):    #1.Subclinical Hypothyroidism (+TPO):  Routine lab check 10/2022 consistent with subclinical hypothyroidism with TSH 0.22 and normal free T4.  Repeat labs 1/2023 again consistent with subclinical hypothyroidism but TSH improved.  Strongly positive TPO antibodies.  Strong family history of Hashimoto in mother and brother--on " medication    Started levothyroxine 25 mcg/day ( X 6/2023)  Follow up labs in range.  Takes generic.  Reports compliance.    Feeling much better.  Energy is better.    Palpitations:  No  Changes to hair or skin: No  Diarrhea/Constipation:No  Changes in vision:No  Dysphagia or Shortness of breath:No  Muscle aches or pain:No  Tremors:No  Changes in weight: Yes: lost 15-20 lbs in 6 months- was trying to loose weight  Heat or cold intolerance: feels cold  History of Lithium or Amiodarone use:No  Head or neck surgery/radiation:No  Family History of Thyroid Problems: Mother and brother with Hashimoto--on medication  Wt Readings from Last 2 Encounters:   10/17/23 122.5 kg (270 lb)   07/31/23 124.7 kg (275 lb)      PMH/PSH:  Past Medical History:   Diagnosis Date    Anxiety     Hashimoto's thyroiditis 2022    heart murmur congenital    asymptomatic    Major depressive disorder, single episode, mild (H24)     Subclinical hypothyroidism 6/29/2023     Past Surgical History:   Procedure Laterality Date    NO HISTORY OF SURGERY       Family Hx:  Family History   Problem Relation Age of Onset    Anxiety Disorder Mother     Thyroid Disease Mother     Thyroid Disease Father     Depression Maternal Grandmother         as well as other maternal side relatives    Hypertension Maternal Grandmother     Diabetes Maternal Grandfather     Coronary Artery Disease Maternal Grandfather     Hypertension Maternal Grandfather     Hyperlipidemia Maternal Grandfather     Brain Cancer Paternal Grandfather         Skin cancer origin?    Other Cancer Paternal Grandfather     Anesthesia Reaction Paternal Grandfather     Asthma Paternal Grandfather      Social Hx:  Social History     Socioeconomic History    Marital status: Single     Spouse name: Not on file    Number of children: 0    Years of education: Not on file    Highest education level: Not on file   Occupational History    Occupation: [employer]     Employer: STUDENT     Comment: Biology  "  Tobacco Use    Smoking status: Never    Smokeless tobacco: Never   Vaping Use    Vaping Use: Never used   Substance and Sexual Activity    Alcohol use: Not Currently     Alcohol/week: 0.0 - 2.0 standard drinks of alcohol     Comment: 0-2 per week    Drug use: No    Sexual activity: Not Currently     Partners: Female     Birth control/protection: Condom   Other Topics Concern     Service Not Asked    Blood Transfusions Not Asked    Caffeine Concern Yes     Comment: rare    Occupational Exposure Not Asked    Hobby Hazards Not Asked    Sleep Concern Not Asked    Stress Concern Not Asked    Weight Concern Not Asked    Special Diet Not Asked    Back Care Not Asked    Exercise Yes     Comment: FB -2 hrs > 4 times per week    Bike Helmet Yes    Seat Belt Yes    Self-Exams Not Asked    Parent/sibling w/ CABG, MI or angioplasty before 65F 55M? No   Social History Narrative    Not on file     Social Determinants of Health     Financial Resource Strain: Not on file   Food Insecurity: Not on file   Transportation Needs: Not on file   Physical Activity: Not on file   Stress: Not on file   Social Connections: Not on file   Interpersonal Safety: Not on file   Housing Stability: Not on file          MEDICATIONS:  has a current medication list which includes the following prescription(s): levothyroxine and venlafaxine.    ROS   ROS: 10 point ROS neg other than the symptoms noted above in the HPI.    Physical Exam   VS: Ht 1.854 m (6' 1\")   Wt 122.5 kg (270 lb)   BMI 35.62 kg/m    GENERAL: healthy, alert and no distress  EYES: Eyes grossly normal to inspection, conjunctivae and sclerae normal  ENT: no nose swelling, nasal discharge.  Thyroid: no apparent thyroid nodules.  Thyroid appears normal in size and nontender.  CV: RRR, no rubs, gallops, no murmurs  RESP: CTAB, no wheezes, rales, or ronchi  ABDO: +BS  EXTREMITIES: no hand tremors.  NEURO: Cranial nerves grossly intact, mentation intact and speech normal  SKIN: No " apparent skin lesions, rash or edema seen   PSYCH: mentation appears normal, affect normal/bright, judgement and insight intact, normal speech and appearance well-groomed      LABS:  TFTs:   Latest Ref Rng 10/11/2023  9:02 AM   ENDO THYROID LABS-[facility]     TSH 0.30 - 4.20 uIU/mL 3.95    Free T3 2.0 - 4.4 pg/mL 3.9    Triiodothyronine (T3) 85 - 202 ng/dL    FREE T4 0.90 - 1.70 ng/dL 1.16      All pertinent notes, labs, and images personally reviewed by me.     A/P  Mr.Zachary KRYSTIN Durham is a 23 year old here for the evaluation of hypothyroidism:    #1.subclinical hypothyroidism (+TPO):  Previous labs consistent with subclinical hypothyroidism.  Strong family history of Hashimoto  Started levothyroxine 25 mcg/day ( X 6/2023)  Follow up labs in range.  Plan  Discussed diagnosis, pathophysiology, management and treatment options of condition with pt.  Discussed subclinical hypothyroidism in general and guidelines about treating subclinical hypothyroidism.  Thyroid labs are in acceptable range.  Continue current dose of thyroid hormone replacement--levothyroxine 25 mcg/day.  Repeat labs and follow up in 1 year or sooner if concerns.  Please make a lab appointment for blood work and follow up clinic appointment in 1 week after that to discuss results.    Discussed s/s of hypothyroidism and hyperthyroidism to watch for.  The patient indicates understanding of these issues and agrees with the plan.    Follow-up:  As noted in AVS    Melissa Day MD  Endocrinology  [facility]  CC: Lm Bear      All questions were answered.  The patient indicates understanding of the above issues and agrees with the plan set forth.

## 2023-10-17 NOTE — NURSING NOTE
Is the patient currently in the state of [place]? YES    Visit mode:VIDEO    If the visit is dropped, the patient can be reconnected by: VIDEO VISIT: Text to cell phone:   Telephone Information:   Mobile [phone]       Will anyone else be joining the visit? NO  (If patient encounters technical issues they should call [phone]    How would you like to obtain your AVS? MyChart    Are changes needed to the allergy or medication list? No    Reason for visit: RECHECK (Thyroid follow up *KME* )    Shelby Kocher VVF

## 2023-10-17 NOTE — LETTER
"    10/17/2023         RE: John Paul Durham  [address]  [place] [address]        Dear Colleague,    Thank you for referring your patient, John Paul Durham, to the [facility]. Please see a copy of my visit note below.    THIS IS A VIDEO VISIT:    Phone call visit/virtual visit encounter:    Name of patient: John Paul Durham    Date of encounter: 10/17/2023    Time of start of video visit: 10:30    Video started: 10:40    Video ended: 10:45    Provider location: working from home/ [facility]    Patient location: patients home.    Mode of transmission: [employer] video/ [employer]    Verbal consent: obtained before starting visit. Pt is agreeable.      The patient has been notified of following:      \"This VIDEO visit will be conducted via a call between you and your physician/provider. We have found that certain health care needs can be provided without the need for a physical exam.  This service lets us provide the care you need with a short phone conversation.  If a prescription is necessary we can send it directly to your pharmacy.  If lab work is needed we can place an order for that and you can then stop by our lab to have the test done at a later time.     With new updates with corona virus patient might be billed as clinic visit.     If during the course of the call the physician/provider feels a telephone visit is not appropriate, you will not be charged for this service.\"      Past medical history, social history, family history, allergy and medications were reviewed and updated as appropriate.  Reviewed pertinent labs, notes, imaging studies personally.    ENDOCRINOLOGY CLINIC NOTE:    Name: John Paul Durham  Seen for follow up of Subclinical Hypothyroidism.  HPI:  John Paul Durham is a 23 year old male who presents for the follow up evaluation of Subclinical Hypothyroidism (+TPO):    #1.Subclinical Hypothyroidism (+TPO):  Routine lab check 10/2022 consistent with subclinical " hypothyroidism with TSH 0.22 and normal free T4.  Repeat labs 1/2023 again consistent with subclinical hypothyroidism but TSH improved.  Strongly positive TPO antibodies.  Strong family history of Hashimoto in mother and brother--on medication    Started levothyroxine 25 mcg/day ( X 6/2023)  Follow up labs in range.  Takes generic.  Reports compliance.    Feeling much better.  Energy is better.    Palpitations:  No  Changes to hair or skin: No  Diarrhea/Constipation:No  Changes in vision:No  Dysphagia or Shortness of breath:No  Muscle aches or pain:No  Tremors:No  Changes in weight: Yes: lost 15-20 lbs in 6 months- was trying to loose weight  Heat or cold intolerance: feels cold  History of Lithium or Amiodarone use:No  Head or neck surgery/radiation:No  Family History of Thyroid Problems: Mother and brother with Hashimoto--on medication  Wt Readings from Last 2 Encounters:   10/17/23 122.5 kg (270 lb)   07/31/23 124.7 kg (275 lb)      PMH/PSH:  Past Medical History:   Diagnosis Date     Anxiety      Hashimoto's thyroiditis 2022     heart murmur congenital    asymptomatic     Major depressive disorder, single episode, mild (H24)      Subclinical hypothyroidism 6/29/2023     Past Surgical History:   Procedure Laterality Date     NO HISTORY OF SURGERY       Family Hx:  Family History   Problem Relation Age of Onset     Anxiety Disorder Mother      Thyroid Disease Mother      Thyroid Disease Father      Depression Maternal Grandmother         as well as other maternal side relatives     Hypertension Maternal Grandmother      Diabetes Maternal Grandfather      Coronary Artery Disease Maternal Grandfather      Hypertension Maternal Grandfather      Hyperlipidemia Maternal Grandfather      Brain Cancer Paternal Grandfather         Skin cancer origin?     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 [employer]     Employer: STUDENT     Comment: Biology   Tobacco Use     Smoking status: Never     Smokeless tobacco: Never   Vaping Use     Vaping Use: Never used   Substance and Sexual Activity     Alcohol use: Not Currently     Alcohol/week: 0.0 - 2.0 standard drinks of alcohol     Comment: 0-2 per week     Drug use: No     Sexual activity: Not Currently     Partners: Female     Birth control/protection: Condom   Other Topics Concern      Service Not Asked     Blood Transfusions Not Asked     Caffeine Concern Yes     Comment: rare     Occupational Exposure Not Asked     Hobby Hazards Not Asked     Sleep Concern Not Asked     Stress Concern Not Asked     Weight Concern Not Asked     Special Diet Not Asked     Back Care Not Asked     Exercise Yes     Comment: FB -2 hrs > 4 times per week     Bike Helmet Yes     Seat Belt Yes     Self-Exams Not Asked     Parent/sibling w/ CABG, MI or angioplasty before 65F 55M? No   Social History Narrative     Not on file     Social Determinants of Health     Financial Resource Strain: Not on file   Food Insecurity: Not on file   Transportation Needs: Not on file   Physical Activity: Not on file   Stress: Not on file   Social Connections: Not on file   Interpersonal Safety: Not on file   Housing Stability: Not on file          MEDICATIONS:  has a current medication list which includes the following prescription(s): levothyroxine and venlafaxine.    ROS   ROS: 10 point ROS neg other than the symptoms noted above in the HPI.    Physical Exam   VS: Ht 1.854 m (6' 1\")   Wt 122.5 kg (270 lb)   BMI 35.62 kg/m    GENERAL: healthy, alert and no distress  EYES: Eyes grossly normal to inspection, conjunctivae and sclerae normal  ENT: no nose swelling, nasal discharge.  Thyroid: no " apparent thyroid nodules.  Thyroid appears normal in size and nontender.  CV: RRR, no rubs, gallops, no murmurs  RESP: CTAB, no wheezes, rales, or ronchi  ABDO: +BS  EXTREMITIES: no hand tremors.  NEURO: Cranial nerves grossly intact, mentation intact and speech normal  SKIN: No apparent skin lesions, rash or edema seen   PSYCH: mentation appears normal, affect normal/bright, judgement and insight intact, normal speech and appearance well-groomed      LABS:  TFTs:   Latest Ref Rng 10/11/2023  9:02 AM   ENDO THYROID LABS-[facility]     TSH 0.30 - 4.20 uIU/mL 3.95    Free T3 2.0 - 4.4 pg/mL 3.9    Triiodothyronine (T3) 85 - 202 ng/dL    FREE T4 0.90 - 1.70 ng/dL 1.16      All pertinent notes, labs, and images personally reviewed by me.     A/P  Mr.Zachary KRYSTIN Durham is a 23 year old here for the evaluation of hypothyroidism:    #1.subclinical hypothyroidism (+TPO):  Previous labs consistent with subclinical hypothyroidism.  Strong family history of Hashimoto  Started levothyroxine 25 mcg/day ( X 6/2023)  Follow up labs in range.  Plan  Discussed diagnosis, pathophysiology, management and treatment options of condition with pt.  Discussed subclinical hypothyroidism in general and guidelines about treating subclinical hypothyroidism.  Thyroid labs are in acceptable range.  Continue current dose of thyroid hormone replacement--levothyroxine 25 mcg/day.  Repeat labs and follow up in 1 year or sooner if concerns.  Please make a lab appointment for blood work and follow up clinic appointment in 1 week after that to discuss results.    Discussed s/s of hypothyroidism and hyperthyroidism to watch for.  The patient indicates understanding of these issues and agrees with the plan.    Follow-up:  As noted in AVS    Melissa Day MD  [facility]inology  [facility]  CC: Lm Bear      All questions were answered.  The patient indicates understanding of the above issues and agrees with the plan set forth.         Again,  thank you for allowing me to participate in the care of your patient.        Sincerely,        Melissa Day MD

## 2023-10-20 DIAGNOSIS — F41.9 ANXIETY: ICD-10-CM

## 2023-10-20 DIAGNOSIS — F32.0 MAJOR DEPRESSIVE DISORDER, SINGLE EPISODE, MILD (H): ICD-10-CM

## 2023-10-20 RX ORDER — VENLAFAXINE HYDROCHLORIDE 150 MG/1
150 CAPSULE, EXTENDED RELEASE ORAL DAILY
Qty: 90 CAPSULE | Refills: 0 | Status: SHIPPED | OUTPATIENT
Start: 2023-10-20 | End: 2024-02-20

## 2023-10-20 NOTE — LETTER
October 27, 2023      John Paul Durham  [address]  [place] [address]        We received a refill request from your pharmacy for your venlafaxine (EFFEXOR XR) 150 MG 24 hr capsule medication.  At this time the nurses were able to give you a heriberto refill, but you are due to be seen for a fasting physical before the next refill.  This appointment can be scheduled by calling [phone] or can be scheduled via [employer] as well.    Thank you for choosing [facility]            Sincerely,              Lm Bear M.D.

## 2023-11-19 ENCOUNTER — HEALTH MAINTENANCE LETTER (OUTPATIENT)
Age: 23
End: 2023-11-19

## 2023-12-18 NOTE — PROGRESS NOTES
DISCHARGE  Reason for Discharge: Patient has failed to schedule further appointments.    Equipment Issued:     Discharge Plan: Patient to continue home program.    Referring Provider:  Henry Marcelo

## 2024-01-24 ENCOUNTER — MYC MEDICAL ADVICE (OUTPATIENT)
Dept: FAMILY MEDICINE | Facility: CLINIC | Age: 24
End: 2024-01-24
Payer: COMMERCIAL

## 2024-01-25 NOTE — TELEPHONE ENCOUNTER
Situation   Called to follow up on [employer] message     Background   Allergies   Allergen Reactions    Sulfa Antibiotics Rash     Red and blotchy rash, happened when pt was a few years old    Zithromax [Azithromycin Dihydrate] Rash     Red and blotchy rash, happened when pt was a few years old.     Assessment   Hives started 2 months ago  Started a new thyroid medication 6 months ago.  No recent antibiotics - see allergy list    Patient called and has not idea what the hives are coming from and denied any new lotions or soaps, and has been eating same foods.     Takes 12.5 and then takes the other half if hives do not go away.     He said with benadryl the hives go away within an hour and then come back randomly during the day and for sure the next night     Moderate itching when hives are present      2nd time upper lip swelled but not since.  Never had tongue swelling or felt airway was compromised.      Recommendations   Scheduled virtual early next week.   Advised to keep a food journal  Continue- benadryl as needed/directed  Advised to try and take a Claritin in the am daily   Keep track all products he uses  Take a photo of hives if happens again and send to [employer].    Advised of red flag symptoms -ed/911  Future Appointments 1/25/2024 - 7/23/2024        Date Visit Type Length Department Provider     1/30/2024  4:00 PM OFFICE VISIT 30 min  FAMILY PRACTICE Lm Bear MD    Location Instructions:     [facility] is located at [address], along [address]. Free parking is available; access the lot by turning north from [place] [address] onto [address], then west onto [address].              2/20/2024  7:00 AM PREVENTATIVE ADULT 30 min  FAMILY PRACTICE Lm Bear MD    Location Instructions:     [facility] is located at [address], [address] Free parking is available; access the lot by [address], then west onto [address].

## 2024-02-13 SDOH — HEALTH STABILITY: PHYSICAL HEALTH: ON AVERAGE, HOW MANY MINUTES DO YOU ENGAGE IN EXERCISE AT THIS LEVEL?: 30 MIN

## 2024-02-13 SDOH — HEALTH STABILITY: PHYSICAL HEALTH: ON AVERAGE, HOW MANY DAYS PER WEEK DO YOU ENGAGE IN MODERATE TO STRENUOUS EXERCISE (LIKE A BRISK WALK)?: 5 DAYS

## 2024-02-13 ASSESSMENT — SOCIAL DETERMINANTS OF HEALTH (SDOH): HOW OFTEN DO YOU GET TOGETHER WITH FRIENDS OR RELATIVES?: MORE THAN THREE TIMES A WEEK

## 2024-02-20 ENCOUNTER — OFFICE VISIT (OUTPATIENT)
Dept: FAMILY MEDICINE | Facility: CLINIC | Age: 24
End: 2024-02-20
Payer: COMMERCIAL

## 2024-02-20 VITALS
DIASTOLIC BLOOD PRESSURE: 80 MMHG | OXYGEN SATURATION: 96 % | BODY MASS INDEX: 35.55 KG/M2 | WEIGHT: 277 LBS | RESPIRATION RATE: 18 BRPM | SYSTOLIC BLOOD PRESSURE: 130 MMHG | HEART RATE: 85 BPM | HEIGHT: 74 IN | TEMPERATURE: 96.2 F

## 2024-02-20 DIAGNOSIS — F41.9 ANXIETY: ICD-10-CM

## 2024-02-20 DIAGNOSIS — R76.8 ANTI-TPO ANTIBODIES PRESENT: ICD-10-CM

## 2024-02-20 DIAGNOSIS — E06.3 HASHIMOTO'S THYROIDITIS: ICD-10-CM

## 2024-02-20 DIAGNOSIS — Z51.81 MEDICATION MONITORING ENCOUNTER: ICD-10-CM

## 2024-02-20 DIAGNOSIS — Z00.00 ROUTINE GENERAL MEDICAL EXAMINATION AT A HEALTH CARE FACILITY: Primary | ICD-10-CM

## 2024-02-20 DIAGNOSIS — Z13.6 CARDIOVASCULAR SCREENING; LDL GOAL LESS THAN 160: ICD-10-CM

## 2024-02-20 DIAGNOSIS — E66.01 MORBID OBESITY (H): ICD-10-CM

## 2024-02-20 DIAGNOSIS — E03.8 SUBCLINICAL HYPOTHYROIDISM: ICD-10-CM

## 2024-02-20 DIAGNOSIS — Z23 NEED FOR COVID-19 VACCINE: ICD-10-CM

## 2024-02-20 DIAGNOSIS — F32.0 MAJOR DEPRESSIVE DISORDER, SINGLE EPISODE, MILD (H): ICD-10-CM

## 2024-02-20 PROBLEM — S93.401S SPRAIN OF RIGHT ANKLE, UNSPECIFIED LIGAMENT, SEQUELA: Status: RESOLVED | Noted: 2023-09-06 | Resolved: 2024-02-20

## 2024-02-20 PROBLEM — S93.401A SPRAIN OF RIGHT ANKLE, UNSPECIFIED LIGAMENT, INITIAL ENCOUNTER: Status: RESOLVED | Noted: 2023-09-06 | Resolved: 2024-02-20

## 2024-02-20 LAB
ALBUMIN SERPL BCG-MCNC: 4.7 G/DL (ref 3.5–5.2)
ALBUMIN UR-MCNC: 30 MG/DL
ALP SERPL-CCNC: 87 U/L (ref 40–150)
ALT SERPL W P-5'-P-CCNC: 63 U/L (ref 0–70)
ANION GAP SERPL CALCULATED.3IONS-SCNC: 12 MMOL/L (ref 7–15)
APPEARANCE UR: CLEAR
AST SERPL W P-5'-P-CCNC: 33 U/L (ref 0–45)
BACTERIA #/AREA URNS HPF: ABNORMAL /HPF
BILIRUB SERPL-MCNC: 0.4 MG/DL
BILIRUB UR QL STRIP: NEGATIVE
BUN SERPL-MCNC: 10.8 MG/DL (ref 6–20)
CALCIUM SERPL-MCNC: 9.5 MG/DL (ref 8.6–10)
CHLORIDE SERPL-SCNC: 101 MMOL/L (ref 98–107)
CHOLEST SERPL-MCNC: 211 MG/DL
COLOR UR AUTO: YELLOW
CREAT SERPL-MCNC: 1.04 MG/DL (ref 0.67–1.17)
DEPRECATED HCO3 PLAS-SCNC: 26 MMOL/L (ref 22–29)
EGFRCR SERPLBLD CKD-EPI 2021: >90 ML/MIN/1.73M2
ERYTHROCYTE [DISTWIDTH] IN BLOOD BY AUTOMATED COUNT: 12 % (ref 10–15)
FASTING STATUS PATIENT QL REPORTED: YES
GLUCOSE SERPL-MCNC: 87 MG/DL (ref 70–99)
GLUCOSE UR STRIP-MCNC: NEGATIVE MG/DL
HCT VFR BLD AUTO: 44 % (ref 40–53)
HDLC SERPL-MCNC: 43 MG/DL
HGB BLD-MCNC: 15.4 G/DL (ref 13.3–17.7)
HGB UR QL STRIP: ABNORMAL
KETONES UR STRIP-MCNC: NEGATIVE MG/DL
LDLC SERPL CALC-MCNC: 154 MG/DL
LEUKOCYTE ESTERASE UR QL STRIP: NEGATIVE
MCH RBC QN AUTO: 30.3 PG (ref 26.5–33)
MCHC RBC AUTO-ENTMCNC: 35 G/DL (ref 31.5–36.5)
MCV RBC AUTO: 86 FL (ref 78–100)
NITRATE UR QL: NEGATIVE
NONHDLC SERPL-MCNC: 168 MG/DL
PH UR STRIP: 6 [PH] (ref 5–7)
PLATELET # BLD AUTO: 294 10E3/UL (ref 150–450)
POTASSIUM SERPL-SCNC: 3.8 MMOL/L (ref 3.4–5.3)
PROT SERPL-MCNC: 7.5 G/DL (ref 6.4–8.3)
RBC # BLD AUTO: 5.09 10E6/UL (ref 4.4–5.9)
RBC #/AREA URNS AUTO: ABNORMAL /HPF
SODIUM SERPL-SCNC: 139 MMOL/L (ref 135–145)
SP GR UR STRIP: >=1.03 (ref 1–1.03)
T4 FREE SERPL-MCNC: 0.97 NG/DL (ref 0.9–1.7)
TRIGL SERPL-MCNC: 70 MG/DL
TSH SERPL DL<=0.005 MIU/L-ACNC: 2.55 UIU/ML (ref 0.3–4.2)
UROBILINOGEN UR STRIP-ACNC: 0.2 E.U./DL
WBC # BLD AUTO: 4.7 10E3/UL (ref 4–11)
WBC #/AREA URNS AUTO: ABNORMAL /HPF

## 2024-02-20 PROCEDURE — 36415 COLL VENOUS BLD VENIPUNCTURE: CPT | Performed by: FAMILY MEDICINE

## 2024-02-20 PROCEDURE — 84439 ASSAY OF FREE THYROXINE: CPT | Performed by: FAMILY MEDICINE

## 2024-02-20 PROCEDURE — 99395 PREV VISIT EST AGE 18-39: CPT | Mod: 25 | Performed by: FAMILY MEDICINE

## 2024-02-20 PROCEDURE — 90480 ADMN SARSCOV2 VAC 1/ONLY CMP: CPT | Performed by: FAMILY MEDICINE

## 2024-02-20 PROCEDURE — 84443 ASSAY THYROID STIM HORMONE: CPT | Performed by: FAMILY MEDICINE

## 2024-02-20 PROCEDURE — 80061 LIPID PANEL: CPT | Performed by: FAMILY MEDICINE

## 2024-02-20 PROCEDURE — 99214 OFFICE O/P EST MOD 30 MIN: CPT | Mod: 25 | Performed by: FAMILY MEDICINE

## 2024-02-20 PROCEDURE — 80053 COMPREHEN METABOLIC PANEL: CPT | Performed by: FAMILY MEDICINE

## 2024-02-20 PROCEDURE — 85027 COMPLETE CBC AUTOMATED: CPT | Performed by: FAMILY MEDICINE

## 2024-02-20 PROCEDURE — 91320 SARSCV2 VAC 30MCG TRS-SUC IM: CPT | Performed by: FAMILY MEDICINE

## 2024-02-20 PROCEDURE — 81001 URINALYSIS AUTO W/SCOPE: CPT | Performed by: FAMILY MEDICINE

## 2024-02-20 RX ORDER — VENLAFAXINE HYDROCHLORIDE 150 MG/1
150 CAPSULE, EXTENDED RELEASE ORAL DAILY
Qty: 90 CAPSULE | Refills: 3 | Status: SHIPPED | OUTPATIENT
Start: 2024-02-20

## 2024-02-20 RX ORDER — LEVOTHYROXINE SODIUM 25 UG/1
25 TABLET ORAL DAILY
Qty: 90 TABLET | Refills: 3 | Status: SHIPPED | OUTPATIENT
Start: 2024-02-20

## 2024-02-20 ASSESSMENT — ANXIETY QUESTIONNAIRES
1. FEELING NERVOUS, ANXIOUS, OR ON EDGE: SEVERAL DAYS
7. FEELING AFRAID AS IF SOMETHING AWFUL MIGHT HAPPEN: NOT AT ALL
2. NOT BEING ABLE TO STOP OR CONTROL WORRYING: SEVERAL DAYS
GAD7 TOTAL SCORE: 2
IF YOU CHECKED OFF ANY PROBLEMS ON THIS QUESTIONNAIRE, HOW DIFFICULT HAVE THESE PROBLEMS MADE IT FOR YOU TO DO YOUR WORK, TAKE CARE OF THINGS AT HOME, OR GET ALONG WITH OTHER PEOPLE: NOT DIFFICULT AT ALL
7. FEELING AFRAID AS IF SOMETHING AWFUL MIGHT HAPPEN: NOT AT ALL
5. BEING SO RESTLESS THAT IT IS HARD TO SIT STILL: NOT AT ALL
GAD7 TOTAL SCORE: 2
GAD7 TOTAL SCORE: 2
4. TROUBLE RELAXING: NOT AT ALL
8. IF YOU CHECKED OFF ANY PROBLEMS, HOW DIFFICULT HAVE THESE MADE IT FOR YOU TO DO YOUR WORK, TAKE CARE OF THINGS AT HOME, OR GET ALONG WITH OTHER PEOPLE?: NOT DIFFICULT AT ALL
6. BECOMING EASILY ANNOYED OR IRRITABLE: NOT AT ALL
3. WORRYING TOO MUCH ABOUT DIFFERENT THINGS: NOT AT ALL

## 2024-02-20 NOTE — LETTER
February 29, 2024      Jensen Durham  [address]  [place] [address]        Dear ,    We are writing to inform you of your test results.     Your recent results have been reviewed.     Labs are overall quite good, except     Elevated lipids  Microscopic blood in your urine     We advise:     Continue current cares.  Balanced low cholesterol diet.  Regular exercise.  Weight loss.     Recheck urine (UA/UC) vs urology consult     Recheck fasting labs in 3-4 months     For additional lab test information, [email or web address] is an excellent reference.     Let us know if you have any questions or concerns.     Thank you for choosing us for your health care needs!     Sincerely,              Lm Bear MD, FAAFP      Resulted Orders   Comprehensive metabolic panel   Result Value Ref Range    Sodium 139 135 - 145 mmol/L      Comment:      Reference intervals for this test were updated on 09/26/2023 to more accurately reflect our healthy population. There may be differences in the flagging of prior results with similar values performed with this method. Interpretation of those prior results can be made in the context of the updated reference intervals.     Potassium 3.8 3.4 - 5.3 mmol/L    Carbon Dioxide (CO2) 26 22 - 29 mmol/L    Anion Gap 12 7 - 15 mmol/L    Urea Nitrogen 10.8 6.0 - 20.0 mg/dL    Creatinine 1.04 0.67 - 1.17 mg/dL    GFR Estimate >90 >60 mL/min/1.73m2    Calcium 9.5 8.6 - 10.0 mg/dL    Chloride 101 98 - 107 mmol/L    Glucose 87 70 - 99 mg/dL    Alkaline Phosphatase 87 40 - 150 U/L      Comment:      Reference intervals for this test were updated on 11/14/2023 to more accurately reflect our healthy population. There may be differences in the flagging of prior results with similar values performed with this method. Interpretation of those prior results can be made in the context of the updated reference intervals.    AST 33 0 - 45 U/L      Comment:      Reference intervals for this test were  updated on 6/12/2023 to more accurately reflect our healthy population. There may be differences in the flagging of prior results with similar values performed with this method. Interpretation of those prior results can be made in the context of the updated reference intervals.    ALT 63 0 - 70 U/L      Comment:      Reference intervals for this test were updated on 6/12/2023 to more accurately reflect our healthy population. There may be differences in the flagging of prior results with similar values performed with this method. Interpretation of those prior results can be made in the context of the updated reference intervals.      Protein Total 7.5 6.4 - 8.3 g/dL    Albumin 4.7 3.5 - 5.2 g/dL    Bilirubin Total 0.4 <=1.2 mg/dL   Lipid panel reflex to direct LDL Fasting   Result Value Ref Range    Cholesterol 211 (H) <200 mg/dL    Triglycerides 70 <150 mg/dL    Direct Measure HDL 43 >=40 mg/dL    LDL Cholesterol Calculated 154 (H) <=100 mg/dL    Non HDL Cholesterol 168 (H) <130 mg/dL    Patient Fasting > 8hrs? Yes     Narrative    Cholesterol  Desirable:  <200 mg/dL    Triglycerides  Normal:  Less than 150 mg/dL  Borderline High:  150-199 mg/dL  High:  200-499 mg/dL  Very High:  Greater than or equal to 500 mg/dL    Direct Measure HDL  Female:  Greater than or equal to 50 mg/dL   Male:  Greater than or equal to 40 mg/dL    LDL Cholesterol  Desirable:  <100mg/dL  Above Desirable:  100-129 mg/dL   Borderline High:  130-159 mg/dL   High:  160-189 mg/dL   Very High:  >= 190 mg/dL    Non HDL Cholesterol  Desirable:  130 mg/dL  Above Desirable:  130-159 mg/dL  Borderline High:  160-189 mg/dL  High:  190-219 mg/dL  Very High:  Greater than or equal to 220 mg/dL   CBC with platelets   Result Value Ref Range    WBC Count 4.7 4.0 - 11.0 10e3/uL    RBC Count 5.09 4.40 - 5.90 10e6/uL    Hemoglobin 15.4 13.3 - 17.7 g/dL    Hematocrit 44.0 40.0 - 53.0 %    MCV 86 78 - 100 fL    MCH 30.3 26.5 - 33.0 pg    MCHC 35.0 31.5 - 36.5  g/dL    RDW 12.0 10.0 - 15.0 %    Platelet Count 294 150 - 450 10e3/uL   UA Macroscopic with reflex to Microscopic and Culture   Result Value Ref Range    Color Urine Yellow Colorless, Straw, Light Yellow, Yellow    Appearance Urine Clear Clear    Glucose Urine Negative Negative mg/dL    Bilirubin Urine Negative Negative    Ketones Urine Negative Negative mg/dL    Specific Gravity Urine >=1.030 1.003 - 1.035    Blood Urine Trace (A) Negative    pH Urine 6.0 5.0 - 7.0    Protein Albumin Urine 30 (A) Negative mg/dL    Urobilinogen Urine 0.2 0.2, 1.0 E.U./dL    Nitrite Urine Negative Negative    Leukocyte Esterase Urine Negative Negative   TSH   Result Value Ref Range    TSH 2.55 0.30 - 4.20 uIU/mL   T4, free   Result Value Ref Range    Free T4 0.97 0.90 - 1.70 ng/dL   UA Microscopic with Reflex to Culture   Result Value Ref Range    Bacteria Urine Few (A) None Seen /HPF    RBC Urine 2-5 (A) 0-2 /HPF /HPF    WBC Urine None Seen 0-5 /HPF /HPF    Narrative    Urine Culture not indicated       If you have any questions or concerns, please call the clinic at the number listed above.       Sincerely,      Lm Bear MD

## 2024-02-20 NOTE — PROGRESS NOTES
Preventive Care Visit  [facility]  Lm Bear MD, Family Medicine  Feb 20, 2024      Assessment & Plan     Routine general medical examination at a health care facility    - Comprehensive metabolic panel  - Lipid panel reflex to direct LDL Fasting  - CBC with platelets  - UA Macroscopic with reflex to Microscopic and Culture  - TSH  - T4, free  - PRIMARY CARE FOLLOW-UP SCHEDULING    Hashimoto's thyroiditis    - TSH  - T4, free  - PRIMARY CARE FOLLOW-UP SCHEDULING  - OFFICE/OUTPT VISIT,EST,LEVL III    Anti-TPO antibodies present    - TSH  - T4, free  - PRIMARY CARE FOLLOW-UP SCHEDULING  - OFFICE/OUTPT VISIT,EST,LEVL III    Subclinical hypothyroidism    - TSH  - T4, free  - levothyroxine (SYNTHROID/LEVOTHROID) 25 MCG tablet  Dispense: 90 tablet; Refill: 3  - PRIMARY CARE FOLLOW-UP SCHEDULING  - OFFICE/OUTPT VISIT,ESTLEVL III    CARDIOVASCULAR SCREENING; LDL GOAL LESS THAN 160    - Lipid panel reflex to direct LDL Fasting  - PRIMARY CARE FOLLOW-UP SCHEDULING  - OFFICE/OUTPT VISIT,EST,LEVL III    Major depressive disorder, single episode, mild (H24)    - TSH  - T4, free  - venlafaxine (EFFEXOR XR) 150 MG 24 hr capsule  Dispense: 90 capsule; Refill: 3  - PRIMARY CARE FOLLOW-UP SCHEDULING  - OFFICE/OUTPT VISIT,ESTLEVL III    Anxiety    - TSH  - T4, free  - venlafaxine (EFFEXOR XR) 150 MG 24 hr capsule  Dispense: 90 capsule; Refill: 3  - PRIMARY CARE FOLLOW-UP SCHEDULING  - OFFICE/OUTPT VISIT,EST,LEVL III    Morbid obesity (H)    - PRIMARY CARE FOLLOW-UP SCHEDULING  - OFFICE/OUTPT VISIT,EST,LEVL III    Medication monitoring encounter    - Comprehensive metabolic panel  - Lipid panel reflex to direct LDL Fasting  - CBC with platelets  - UA Macroscopic with reflex to Microscopic and Culture  - TSH  - T4, free  - PRIMARY CARE FOLLOW-UP SCHEDULING  - OFFICE/OUTPT VISIT,ESTLEVL III    Need for COVID-19 vaccine    - COVID-19 12+ (2023-24) (PFIZER)  - PRIMARY CARE FOLLOW-UP SCHEDULING  - OFFICE/OUTPT  "VISIT,EST,LEVL III    Prescription drug management    20 minutes spent by me on the date of the encounter doing chart review, history and exam, documentation and further activities per the note    BMI  Estimated body mass index is 35.56 kg/m  as calculated from the following:    Height as of this encounter: 1.88 m (6' 2\").    Weight as of this encounter: 125.6 kg (277 lb).   Weight management plan: diet and exercise    Counseling  Appropriate preventive services were discussed with this patient, including applicable screening as appropriate for fall prevention, nutrition, physical activity, Tobacco-use cessation, weight loss and cognition.  Checklist reviewing preventive services available has been given to the patient.  Reviewed patient's diet, addressing concerns and/or questions.   He is at risk for psychosocial distress and has been provided with information to reduce risk.     Patient has been advised of split billing requirements and indicates understanding: Yes    Work on weight loss  Regular exercise  Med refills  Labs pending  Immunizations - desires COVID, declines HPV / Flu    Subjective     Jensen is a 23 year old, presenting for the following:    Physical        2/20/2024     6:54 AM   Additional Questions   Roomed by [facility]      Health Care Directive  Patient does not have a Health Care Directive or Living Will:     Hypothyroid - on levothyroxine 25 mcg    TSH   Date Value Ref Range Status   10/11/2023 3.95 0.30 - 4.20 uIU/mL Final   10/14/2022 8.22 (H) 0.40 - 4.00 mU/L Final   01/02/2017 3.49 0.40 - 4.00 mU/L Final     Since last visit, patient describes the following symptoms: Weight stable, no hair loss, no skin changes, no constipation, no loose stools    Depression and Anxiety Follow-Up - on venlafaxine  How are you doing with your depression since your last visit? Improved   How are you doing with your anxiety since your last visit?  Improved   Are you having other symptoms that might be associated " with depression or anxiety? No  Have you had a significant life event? No   Do you have any concerns with your use of alcohol or other drugs? No        7/31/2023     9:44 AM 1/30/2024     9:01 AM 2/20/2024     6:48 AM   PHQ   PHQ-9 Total Score 3 5    5 3   Q9: Thoughts of better off dead/self-harm past 2 weeks Not at all Not at all Not at all         10/10/2022     4:05 PM 1/30/2024     9:01 AM 2/20/2024     6:49 AM   SERGIO-7 SCORE   Total Score  4 (minimal anxiety) 2 (minimal anxiety)   Total Score 0 4    4 2       Suicide Assessment Five-step Evaluation and Treatment (SAFE-T)      Lipids    Recent Labs   Lab Test 01/12/23  1535 10/14/22  0859   CHOL 227* 217*   HDL 30* 33*   * 162*   TRIG 154* 111         2/13/2024   General Health   How would you rate your overall physical health? Good   Feel stress (tense, anxious, or unable to sleep) Only a little   (!) STRESS CONCERN      2/13/2024   Nutrition   Three or more servings of calcium each day? Yes   Diet: Regular (no restrictions)   How many servings of fruit and vegetables per day? (!) 2-3   How many sweetened beverages each day? 0-1         2/13/2024   Exercise   Days per week of moderate/strenous exercise 5 days   Average minutes spent exercising at this level 30 min         2/13/2024   Social Factors   Frequency of gathering with friends or relatives More than three times a week   Worry food won't last until get money to buy more No   Food not last or not have enough money for food? No   Do you have housing?  Yes   Are you worried about losing your housing? No   Lack of transportation? No   Unable to get utilities (heat,electricity)? No         2/13/2024   Dental   Dentist two times every year? Yes         2/13/2024   TB Screening   Were you born outside of [place]?  No       Today's PHQ-9 Score:       2/20/2024     6:48 AM   PHQ-9 SCORE   PHQ-9 Total Score MyChart 3 (Minimal depression)   PHQ-9 Total Score 3         2/13/2024   Substance Use   Alcohol more  than 3/day or more than 7/wk No   Do you use any other substances recreationally? No     Social History     Tobacco Use    Smoking status: Never    Smokeless tobacco: Never   Vaping Use    Vaping Use: Never used   Substance Use Topics    Alcohol use: Yes     Alcohol/week: 0.0 - 2.0 standard drinks of alcohol     Comment: Once or twice a month    Drug use: No             2/13/2024   One time HIV Screening   Previous HIV test? I don't know         2/13/2024   STI Screening   New sexual partner(s) since last STI/HIV test? No         2/13/2024   Contraception/Family Planning   Questions about contraception or family planning No        Reviewed and updated as needed this visit by Provider                  Patient Active Problem List   Diagnosis    Major depressive disorder, single episode, mild (H24)    Anxiety    Morbid obesity (H)    CARDIOVASCULAR SCREENING; LDL GOAL LESS THAN 160    Hashimoto's thyroiditis    Subclinical hypothyroidism    Anti-TPO antibodies present       Past Medical History:   Diagnosis Date    Anxiety     Hashimoto's thyroiditis 2022    heart murmur congenital    asymptomatic    Major depressive disorder, single episode, mild (H24)     Subclinical hypothyroidism 6/29/2023       Past Surgical History:   Procedure Laterality Date    NO HISTORY OF SURGERY         Current Outpatient Medications   Medication Sig Dispense Refill    levothyroxine (SYNTHROID/LEVOTHROID) 25 MCG tablet Take 1 tablet (25 mcg) by mouth daily 90 tablet 3    venlafaxine (EFFEXOR XR) 150 MG 24 hr capsule Take 1 capsule (150 mg) by mouth daily 90 capsule 3       Allergies   Allergen Reactions    Sulfa Antibiotics Rash     Red and blotchy rash, happened when pt was a few years old    Zithromax [Azithromycin Dihydrate] Rash     Red and blotchy rash, happened when pt was a few years old.       Family History   Problem Relation Age of Onset    Anxiety Disorder Mother     Thyroid Disease Mother     Thyroid Disease Father      Depression Maternal Grandmother         as well as other maternal side relatives    Hypertension Maternal Grandmother     Diabetes Maternal Grandfather     Coronary Artery Disease Maternal Grandfather     Hypertension Maternal Grandfather     Hyperlipidemia Maternal Grandfather     Brain Cancer Paternal Grandfather         Skin cancer origin?    Anesthesia Reaction Paternal Grandfather     Asthma Paternal Grandfather        Social History     Socioeconomic History    Marital status: Single    Number of children: 0   Occupational History    Occupation: [employer]     Employer: STUDENT     Comment: Biology   Tobacco Use    Smoking status: Never    Smokeless tobacco: Never   Vaping Use    Vaping Use: Never used   Substance and Sexual Activity    Alcohol use: Yes     Alcohol/week: 0.0 - 2.0 standard drinks of alcohol     Comment: Once or twice a month    Drug use: No    Sexual activity: Not Currently     Partners: Female     Birth control/protection: Condom   Other Topics Concern    Caffeine Concern Yes     Comment: rare    Exercise Yes     Comment: FB -2 hrs > 4 times per week    Bike Helmet Yes    Seat Belt Yes    Parent/sibling w/ CABG, MI or angioplasty before 65F 55M? No     Social Determinants of Health     Financial Resource Strain: Low Risk  (2/13/2024)    Financial Resource Strain     Within the past 12 months, have you or your family members you live with been unable to get utilities (heat, electricity) when it was really needed?: No   Food Insecurity: Low Risk  (2/13/2024)    Food Insecurity     Within the past 12 months, did you worry that your food would run out before you got money to buy more?: No     Within the past 12 months, did the food you bought just not last and you didn t have money to get more?: No   Transportation Needs: Low Risk  (2/13/2024)    Transportation Needs     Within the past 12 months, has lack of transportation kept you from medical appointments, getting your medicines,  non-medical meetings or appointments, work, or from getting things that you need?: No   Physical Activity: Sufficiently Active (2/13/2024)    Exercise Vital Sign     Days of Exercise per Week: 5 days     Minutes of Exercise per Session: 30 min   Stress: No Stress Concern Present (2/13/2024)    [employer] - Occupational Stress Questionnaire     Feeling of Stress : Only a little   Social Connections: Unknown (2/13/2024)    Social Connection and Isolation Panel [NHANES]     Frequency of Social Gatherings with Friends and Family: More than three times a week   Interpersonal Safety: Low Risk  (2/20/2024)    Interpersonal Safety     Do you feel physically and emotionally safe where you currently live?: Yes     Within the past 12 months, have you been hit, slapped, kicked or otherwise physically hurt by someone?: No     Within the past 12 months, have you been humiliated or emotionally abused in other ways by your partner or ex-partner?: No   Housing Stability: Low Risk  (2/13/2024)    Housing Stability     Do you have housing? : Yes     Are you worried about losing your housing?: No     Colonoscopy:  due at age 44  FIT / Cologuard: due at age 39  PSA: due at age 39      Review of Systems  CONSTITUTIONAL: NEGATIVE for fever, chills, change in weight  INTEGUMENTARY/SKIN: NEGATIVE for worrisome rashes, moles or lesions  EYES: NEGATIVE for vision changes or irritation  ENT/MOUTH: NEGATIVE for ear, mouth and throat problems  RESP: NEGATIVE for significant cough or SOB  CV: NEGATIVE for chest pain, palpitations or peripheral edema  GI: NEGATIVE for nausea, abdominal pain, heartburn, or change in bowel habits  : NEGATIVE for frequency, dysuria, or hematuria  MUSCULOSKELETAL: NEGATIVE for significant arthralgias or myalgia  NEURO: NEGATIVE for weakness, dizziness or paresthesias  ENDOCRINE: NEGATIVE for temperature intolerance, skin/hair changes  HEME: NEGATIVE for bleeding problems  PSYCHIATRIC:  "NEGATIVE for changes in mood or affect     Objective    Exam  /80   Pulse 85   Temp (!) 96.2  F (35.7  C) (Tympanic)   Resp 18   Ht 1.88 m (6' 2\")   Wt 125.6 kg (277 lb)   SpO2 96%   BMI 35.56 kg/m     Estimated body mass index is 35.56 kg/m  as calculated from the following:    Height as of this encounter: 1.88 m (6' 2\").    Weight as of this encounter: 125.6 kg (277 lb).    Physical Exam  GENERAL: alert and no distress  EYES: Eyes grossly normal to inspection, PERRL and conjunctivae and sclerae normal  HENT: ear canals and TM's normal, nose and mouth without ulcers or lesions  NECK: no adenopathy, no asymmetry, masses, or scars  RESP: lungs clear to auscultation - no rales, rhonchi or wheezes  CV: regular rate and rhythm, normal S1 S2, no S3 or S4, no murmur, click or rub, no peripheral edema  ABDOMEN: soft, nontender, no hepatosplenomegaly, no masses and bowel sounds normal   (male): declines  RECTAL: at age 39  MS: no gross musculoskeletal defects noted, no edema  SKIN: no suspicious lesions or rashes  NEURO: Normal strength and tone, mentation intact and speech normal  PSYCH: mentation appears normal, affect normal/bright    Signed Electronically by:            Lm Bear MD, FAAFP     [facility]  [address] [place] [id number]  [email or web address]   Office: [phone]  Fax: [phone]         Answers submitted by the patient for this visit:  Patient Health Questionnaire (Submitted on 2/20/2024)  If you checked off any problems, how difficult have these problems made it for you to do your work, take care of things at home, or get along with other people?: Not difficult at all  PHQ9 TOTAL SCORE: 3  SERGIO-7 (Submitted on 2/20/2024)  SERGIO 7 TOTAL SCORE: 2    "

## 2024-02-23 DIAGNOSIS — E78.5 HYPERLIPIDEMIA LDL GOAL <160: Primary | ICD-10-CM

## 2024-02-23 PROBLEM — Z13.6 CARDIOVASCULAR SCREENING; LDL GOAL LESS THAN 160: Status: RESOLVED | Noted: 2022-08-22 | Resolved: 2024-02-23

## 2024-03-04 DIAGNOSIS — R31.29 MICROSCOPIC HEMATURIA: Primary | ICD-10-CM

## 2024-06-04 NOTE — PROGRESS NOTES
SUBJECTIVE:   John Paul Durham is a 18 year old male who presents to clinic today for the following health issues:    Medication Followup of Sertraline    Taking Medication as prescribed: yes    Side Effects:  None    Medication Helping Symptoms:  yes     Depression/Anxiety: Patient had a PHQ9 score of 5 and a GAD7 score of 2 today. Patient's depression/anxiety is well controlled. Patient is currently prescribed 50 mg Zoloft daily for depression/anxiety management. Patient reports no side effects from the medication.     Problem list and histories reviewed & adjusted, as indicated.  Additional history: as documented    BP Readings from Last 3 Encounters:   01/07/19 126/86 (60 %/ 93 %)*   03/05/18 116/82 (29 %/ 86 %)*   02/13/17 106/74 (10 %/ 65 %)*     *BP percentiles are based on the August 2017 AAP Clinical Practice Guideline for boys       body mass index is 37.07 kg/m .    Wt Readings from Last 4 Encounters:   01/07/19 127.5 kg (281 lb) (>99 %)*   03/05/18 120.7 kg (266 lb 2 oz) (>99 %)*   02/13/17 100.2 kg (221 lb) (98 %)*   02/05/17 114.3 kg (252 lb) (>99 %)*     * Growth percentiles are based on CDC (Boys, 2-20 Years) data.       Health Maintenance    Health Maintenance Due   Topic Date Due     HPV IMMUNIZATION (1 - Male 3-dose series) 07/26/2010     MENINGITIS IMMUNIZATION (2 - 2-dose series) 07/26/2015     HIV SCREEN (SYSTEM ASSIGNED)  07/26/2017     INFLUENZA VACCINE (1) 09/01/2018       Current Problem List    Patient Active Problem List   Diagnosis     Allergic state     Major depressive disorder, single episode, mild (H)     Morbid obesity (H)       Past Medical History    Past Medical History:   Diagnosis Date     heart murmur congenital    asymptomatic     Major depressive disorder, single episode, mild (H)        Past Surgical History    Past Surgical History:   Procedure Laterality Date     NO HISTORY OF SURGERY         Current Medications    Current Outpatient Medications   Medication Sig  Dispense Refill     sertraline (ZOLOFT) 50 MG tablet Take 1 tablet (50 mg) by mouth daily 90 tablet 3       Allergies    Allergies   Allergen Reactions     Sulfa Drugs Rash     Red and blotchy rash, happened when pt was a few years old     Zithromax [Azithromycin Dihydrate] Rash     Red and blotchy rash, happened when pt was a few years old.       Immunizations    Immunization History   Administered Date(s) Administered     DTAP (<7y) 1999, 1999, 01/27/2000, 11/10/2000, 04/09/2004     HEPA 07/05/2011, 07/12/2013     HepB 1999, 1999, 06/07/2000     Hib (PRP-T) 1999, 1999, 01/27/2000, 11/10/2000     Influenza (H1N1) 12/10/2009     Influenza (IIV3) PF 09/30/2010     Influenza Intranasal Vaccine 10/04/2010     MMR 11/10/2000, 04/09/2004     Meningococcal (Menactra ) 07/12/2013     Pneumo Conj 13-V (2010&after) 07/09/2015     Pneumococcal (PCV 7) 05/02/2001     Poliovirus, inactivated (IPV) 1999, 1999, 11/10/2000, 04/09/2004     TDAP Vaccine (Boostrix) 07/05/2011     Tdap (Adacel,Boostrix) 07/05/2011     Varicella 11/10/2000, 07/05/2011       Family History    Family History   Problem Relation Age of Onset     C.A.D. Maternal Grandfather      Brain Cancer Paternal Grandfather         Skin cancer origin?     Depression Maternal Grandmother         as well as other maternal side relatives       Social History    Social History     Socioeconomic History     Marital status: Single     Spouse name: Not on file     Number of children: 0     Years of education: Not on file     Highest education level: Not on file   Social Needs     Financial resource strain: Not on file     Food insecurity - worry: Not on file     Food insecurity - inability: Not on file     Transportation needs - medical: Not on file     Transportation needs - non-medical: Not on file   Occupational History     Occupation: [employer]     Employer: STUDENT     Comment: Biology   Tobacco Use     Smoking  "status: Never Smoker     Smokeless tobacco: Never Used   Substance and Sexual Activity     Alcohol use: Yes     Alcohol/week: 0.0 oz     Comment: 1 time per month     Drug use: No     Sexual activity: No   Other Topics Concern      Service Not Asked     Blood Transfusions Not Asked     Caffeine Concern Yes     Comment: rare     Occupational Exposure Not Asked     Hobby Hazards Not Asked     Sleep Concern Not Asked     Stress Concern Not Asked     Weight Concern Not Asked     Special Diet Not Asked     Back Care Not Asked     Exercise Yes     Comment: FB -2 hrs > 4 times per week     Bike Helmet Yes     Seat Belt Yes     Self-Exams Not Asked   Social History Narrative     Not on file       All above reviewed and updated, all stable unless otherwise noted    Recent labs reviewed    ROS:  Constitutional, HEENT, cardiovascular, pulmonary, GI, , musculoskeletal, neuro, skin, endocrine and psych systems are negative, except as otherwise noted.    OBJECTIVE:                                                    /86   Pulse 75   Temp 98.7  F (37.1  C) (Oral)   Ht 1.854 m (6' 1\")   Wt 127.5 kg (281 lb)   SpO2 96%   BMI 37.07 kg/m    Body mass index is 37.07 kg/m .  GENERAL: healthy, alert and no distress  EYES: Eyes grossly normal to inspection  HENT:ear canals and TM's normal upon viewing with otoscope, nose and mouth without ulcers or lesions upon viewing with otoscope  NECK: no tenderness, no adenopathy, no asymmetry, no masses, no stiffness; thyroid- normal to palpation  RESP: lungs clear to auscultation - no rales, no rhonchi, no wheezes  CV: regular rates and rhythm, normal S1 S2, no S3 or S4 and no murmur, no click or rub -  ABDOMEN: soft, no tenderness, no  hepatosplenomegaly, no masses, normal bowel sounds  MS: extremities- no gross deformities noted, no edema  SKIN: no suspicious lesions, no rashes  NEURO: strength and tone- normal, sensory exam- grossly normal, mentation- intact, speech- " normal  BACK: no CVA tenderness, no paralumbar tenderness  PSYCH: Alert and oriented times 3; speech- coherent , normal rate and volume; able to articulate logical thoughts, able to abstract reason, no tangential thoughts, no hallucinations or delusions, affect- normal    DIAGNOSTICS/PROCEDURES:                                                      No results found for this or any previous visit (from the past 24 hour(s)).     ASSESSMENT/PLAN:                                                        ICD-10-CM    1. Major depressive disorder, single episode, mild (H) F32.0 sertraline (ZOLOFT) 50 MG tablet   2. Elevated blood pressure reading without diagnosis of hypertension R03.0    3. Morbid obesity (H) E66.01    4. Medication monitoring encounter Z51.81      Discussed treatment/modality options, including risk and benefits, he desires advised dentist every 6 months and advised diet and exercise. All diagnosis above reviewed and noted above, otherwise stable.  See [employer] orders for further details.  Follow up as needed.    1) Patient had a PHQ9 score of 5 and a GAD7 score of 2 today. Patient's depression/anxiety is well controlled. Patient is currently prescribed 50 mg Zoloft daily for depression/anxiety management. Advised continued use.     2) Prescription refilled today.    3) Follow up in 6 months for medication recheck visit.    Health Maintenance Due   Topic Date Due     HPV IMMUNIZATION (1 - Male 3-dose series) 07/26/2010     MENINGITIS IMMUNIZATION (2 - 2-dose series) 07/26/2015     HIV SCREEN (SYSTEM ASSIGNED)  07/26/2017     INFLUENZA VACCINE (1) 09/01/2018     This document serves as a record of the services and decisions personally performed and made by Lm Bear MD. It was created on his behalf by Ernesto Jerez, a trained medical scribe. The creation of this document is based on the provider's statements to the medical scribe.  Ernesto Jerez January 7, 2019 10:54 AM     The information in this document,  created by the medical scribe for me, accurately reflects the services I personally performed and the decisions made by me. I have reviewed and approved this document for accuracy prior to leaving the patient care area.  January 7, 2019            Lm Bear MD [address]  [id number] [phone] Fax     2

## 2024-06-10 NOTE — TELEPHONE ENCOUNTER
Louis sent to patient to fill out SERGIO/PHQ    Future Appointments   Date Time Provider Department Center   2/14/2022  3:00 PM Lm Bear MD [facility]        Maria De Jesus Krishnamurthy RN  [facility]    
Routing refill request to provider for review/approval because:  Pt has been given heriberto, he has 2 different RX for this drug.     He has appt in 1 month     Debra Stephens RN         
rx done, please do PHQ/SERGIO, await visit    PHQ 3/22/2021 7/21/2021 7/21/2021   PHQ-9 Total Score 5 10 10   Q9: Thoughts of better off dead/self-harm past 2 weeks Not at all Not at all Not at all   F/U: Thoughts of suicide or self-harm - - -   F/U: Self harm-plan - - -   F/U: Self-harm action - - -   F/U: Safety concerns - - -     SERGIO-7 SCORE 3/22/2021 7/21/2021 7/21/2021   Total Score - - 0 (minimal anxiety)   Total Score 10 0 0           
English

## 2024-09-04 ENCOUNTER — TRANSFERRED RECORDS (OUTPATIENT)
Dept: HEALTH INFORMATION MANAGEMENT | Facility: CLINIC | Age: 24
End: 2024-09-04
Payer: COMMERCIAL

## 2024-11-06 ASSESSMENT — PATIENT HEALTH QUESTIONNAIRE - PHQ9: SUM OF ALL RESPONSES TO PHQ QUESTIONS 1-9: 5

## 2024-12-05 ENCOUNTER — OFFICE VISIT (OUTPATIENT)
Dept: INTERNAL MEDICINE | Facility: CLINIC | Age: 24
End: 2024-12-05
Payer: COMMERCIAL

## 2024-12-05 VITALS
DIASTOLIC BLOOD PRESSURE: 85 MMHG | WEIGHT: 281.2 LBS | RESPIRATION RATE: 16 BRPM | HEART RATE: 87 BPM | OXYGEN SATURATION: 99 % | TEMPERATURE: 99.8 F | SYSTOLIC BLOOD PRESSURE: 144 MMHG | BODY MASS INDEX: 36.09 KG/M2 | HEIGHT: 74 IN

## 2024-12-05 DIAGNOSIS — J02.9 ACUTE PHARYNGITIS, UNSPECIFIED ETIOLOGY: Primary | ICD-10-CM

## 2024-12-05 DIAGNOSIS — J06.9 VIRAL URI: ICD-10-CM

## 2024-12-05 LAB
DEPRECATED S PYO AG THROAT QL EIA: NEGATIVE
S PYO DNA THROAT QL NAA+PROBE: NOT DETECTED

## 2024-12-05 PROCEDURE — 99213 OFFICE O/P EST LOW 20 MIN: CPT | Performed by: INTERNAL MEDICINE

## 2024-12-05 PROCEDURE — 87651 STREP A DNA AMP PROBE: CPT | Performed by: INTERNAL MEDICINE

## 2024-12-05 ASSESSMENT — ENCOUNTER SYMPTOMS
SORE THROAT: 1
HEADACHES: 1

## 2024-12-05 ASSESSMENT — PAIN SCALES - GENERAL: PAINLEVEL_OUTOF10: NO PAIN (0)

## 2024-12-05 ASSESSMENT — PATIENT HEALTH QUESTIONNAIRE - PHQ9
SUM OF ALL RESPONSES TO PHQ QUESTIONS 1-9: 4
10. IF YOU CHECKED OFF ANY PROBLEMS, HOW DIFFICULT HAVE THESE PROBLEMS MADE IT FOR YOU TO DO YOUR WORK, TAKE CARE OF THINGS AT HOME, OR GET ALONG WITH OTHER PEOPLE: NOT DIFFICULT AT ALL
SUM OF ALL RESPONSES TO PHQ QUESTIONS 1-9: 4

## 2025-02-11 DIAGNOSIS — F32.0 MAJOR DEPRESSIVE DISORDER, SINGLE EPISODE, MILD: ICD-10-CM

## 2025-02-11 DIAGNOSIS — F41.9 ANXIETY: ICD-10-CM

## 2025-02-11 RX ORDER — VENLAFAXINE HYDROCHLORIDE 150 MG/1
150 CAPSULE, EXTENDED RELEASE ORAL DAILY
Qty: 90 CAPSULE | Refills: 0 | Status: SHIPPED | OUTPATIENT
Start: 2025-02-11

## 2025-02-21 SDOH — HEALTH STABILITY: PHYSICAL HEALTH: ON AVERAGE, HOW MANY DAYS PER WEEK DO YOU ENGAGE IN MODERATE TO STRENUOUS EXERCISE (LIKE A BRISK WALK)?: 4 DAYS

## 2025-02-21 SDOH — HEALTH STABILITY: PHYSICAL HEALTH: ON AVERAGE, HOW MANY MINUTES DO YOU ENGAGE IN EXERCISE AT THIS LEVEL?: 30 MIN

## 2025-02-21 ASSESSMENT — ANXIETY QUESTIONNAIRES
2. NOT BEING ABLE TO STOP OR CONTROL WORRYING: NOT AT ALL
4. TROUBLE RELAXING: NOT AT ALL
GAD7 TOTAL SCORE: 0
6. BECOMING EASILY ANNOYED OR IRRITABLE: NOT AT ALL
GAD7 TOTAL SCORE: 0
GAD7 TOTAL SCORE: 0
8. IF YOU CHECKED OFF ANY PROBLEMS, HOW DIFFICULT HAVE THESE MADE IT FOR YOU TO DO YOUR WORK, TAKE CARE OF THINGS AT HOME, OR GET ALONG WITH OTHER PEOPLE?: NOT DIFFICULT AT ALL
1. FEELING NERVOUS, ANXIOUS, OR ON EDGE: NOT AT ALL
3. WORRYING TOO MUCH ABOUT DIFFERENT THINGS: NOT AT ALL
5. BEING SO RESTLESS THAT IT IS HARD TO SIT STILL: NOT AT ALL
IF YOU CHECKED OFF ANY PROBLEMS ON THIS QUESTIONNAIRE, HOW DIFFICULT HAVE THESE PROBLEMS MADE IT FOR YOU TO DO YOUR WORK, TAKE CARE OF THINGS AT HOME, OR GET ALONG WITH OTHER PEOPLE: NOT DIFFICULT AT ALL
7. FEELING AFRAID AS IF SOMETHING AWFUL MIGHT HAPPEN: NOT AT ALL
7. FEELING AFRAID AS IF SOMETHING AWFUL MIGHT HAPPEN: NOT AT ALL

## 2025-02-21 ASSESSMENT — PATIENT HEALTH QUESTIONNAIRE - PHQ9
10. IF YOU CHECKED OFF ANY PROBLEMS, HOW DIFFICULT HAVE THESE PROBLEMS MADE IT FOR YOU TO DO YOUR WORK, TAKE CARE OF THINGS AT HOME, OR GET ALONG WITH OTHER PEOPLE: NOT DIFFICULT AT ALL
SUM OF ALL RESPONSES TO PHQ QUESTIONS 1-9: 2
SUM OF ALL RESPONSES TO PHQ QUESTIONS 1-9: 2

## 2025-02-21 ASSESSMENT — SOCIAL DETERMINANTS OF HEALTH (SDOH): HOW OFTEN DO YOU GET TOGETHER WITH FRIENDS OR RELATIVES?: TWICE A WEEK

## 2025-02-24 ENCOUNTER — OFFICE VISIT (OUTPATIENT)
Dept: FAMILY MEDICINE | Facility: CLINIC | Age: 25
End: 2025-02-24
Attending: FAMILY MEDICINE
Payer: COMMERCIAL

## 2025-02-24 VITALS
TEMPERATURE: 97.4 F | RESPIRATION RATE: 18 BRPM | BODY MASS INDEX: 36.76 KG/M2 | WEIGHT: 286.4 LBS | SYSTOLIC BLOOD PRESSURE: 120 MMHG | DIASTOLIC BLOOD PRESSURE: 82 MMHG | HEART RATE: 72 BPM | HEIGHT: 74 IN | OXYGEN SATURATION: 98 %

## 2025-02-24 DIAGNOSIS — Z23 NEED FOR HPV VACCINATION: ICD-10-CM

## 2025-02-24 DIAGNOSIS — R31.29 MICROSCOPIC HEMATURIA: ICD-10-CM

## 2025-02-24 DIAGNOSIS — F32.0 MAJOR DEPRESSIVE DISORDER, SINGLE EPISODE, MILD: ICD-10-CM

## 2025-02-24 DIAGNOSIS — F41.9 ANXIETY: ICD-10-CM

## 2025-02-24 DIAGNOSIS — E66.01 MORBID OBESITY (H): ICD-10-CM

## 2025-02-24 DIAGNOSIS — Z51.81 MEDICATION MONITORING ENCOUNTER: ICD-10-CM

## 2025-02-24 DIAGNOSIS — R76.8 ANTI-TPO ANTIBODIES PRESENT: ICD-10-CM

## 2025-02-24 DIAGNOSIS — Z23 NEED FOR COVID-19 VACCINE: ICD-10-CM

## 2025-02-24 DIAGNOSIS — E78.5 HYPERLIPIDEMIA LDL GOAL <160: ICD-10-CM

## 2025-02-24 DIAGNOSIS — E03.8 SUBCLINICAL HYPOTHYROIDISM: ICD-10-CM

## 2025-02-24 DIAGNOSIS — Z00.00 ROUTINE GENERAL MEDICAL EXAMINATION AT A HEALTH CARE FACILITY: Primary | ICD-10-CM

## 2025-02-24 DIAGNOSIS — E06.3 HASHIMOTO'S THYROIDITIS: ICD-10-CM

## 2025-02-24 LAB
ALBUMIN UR-MCNC: NEGATIVE MG/DL
APPEARANCE UR: CLEAR
BILIRUB UR QL STRIP: NEGATIVE
COLOR UR AUTO: YELLOW
ERYTHROCYTE [DISTWIDTH] IN BLOOD BY AUTOMATED COUNT: 12.2 % (ref 10–15)
GLUCOSE UR STRIP-MCNC: NEGATIVE MG/DL
HCT VFR BLD AUTO: 43.2 % (ref 40–53)
HGB BLD-MCNC: 15.1 G/DL (ref 13.3–17.7)
HGB UR QL STRIP: ABNORMAL
KETONES UR STRIP-MCNC: NEGATIVE MG/DL
LEUKOCYTE ESTERASE UR QL STRIP: NEGATIVE
MCH RBC QN AUTO: 30.5 PG (ref 26.5–33)
MCHC RBC AUTO-ENTMCNC: 35 G/DL (ref 31.5–36.5)
MCV RBC AUTO: 87 FL (ref 78–100)
NITRATE UR QL: NEGATIVE
PH UR STRIP: 5.5 [PH] (ref 5–7)
PLATELET # BLD AUTO: 299 10E3/UL (ref 150–450)
RBC # BLD AUTO: 4.95 10E6/UL (ref 4.4–5.9)
RBC #/AREA URNS AUTO: NORMAL /HPF
SP GR UR STRIP: 1.01 (ref 1–1.03)
UROBILINOGEN UR STRIP-ACNC: 0.2 E.U./DL
WBC # BLD AUTO: 5 10E3/UL (ref 4–11)
WBC #/AREA URNS AUTO: NORMAL /HPF

## 2025-02-24 PROCEDURE — 87086 URINE CULTURE/COLONY COUNT: CPT | Performed by: FAMILY MEDICINE

## 2025-02-24 PROCEDURE — 80061 LIPID PANEL: CPT | Performed by: FAMILY MEDICINE

## 2025-02-24 PROCEDURE — 90651 9VHPV VACCINE 2/3 DOSE IM: CPT | Performed by: FAMILY MEDICINE

## 2025-02-24 PROCEDURE — 99214 OFFICE O/P EST MOD 30 MIN: CPT | Mod: 25 | Performed by: FAMILY MEDICINE

## 2025-02-24 PROCEDURE — 84439 ASSAY OF FREE THYROXINE: CPT | Performed by: FAMILY MEDICINE

## 2025-02-24 PROCEDURE — 85027 COMPLETE CBC AUTOMATED: CPT | Performed by: FAMILY MEDICINE

## 2025-02-24 PROCEDURE — 81001 URINALYSIS AUTO W/SCOPE: CPT | Performed by: FAMILY MEDICINE

## 2025-02-24 PROCEDURE — G2211 COMPLEX E/M VISIT ADD ON: HCPCS | Performed by: FAMILY MEDICINE

## 2025-02-24 PROCEDURE — 36415 COLL VENOUS BLD VENIPUNCTURE: CPT | Performed by: FAMILY MEDICINE

## 2025-02-24 PROCEDURE — 84443 ASSAY THYROID STIM HORMONE: CPT | Performed by: FAMILY MEDICINE

## 2025-02-24 PROCEDURE — 90480 ADMN SARSCOV2 VAC 1/ONLY CMP: CPT | Performed by: FAMILY MEDICINE

## 2025-02-24 PROCEDURE — 91320 SARSCV2 VAC 30MCG TRS-SUC IM: CPT | Performed by: FAMILY MEDICINE

## 2025-02-24 PROCEDURE — 80053 COMPREHEN METABOLIC PANEL: CPT | Performed by: FAMILY MEDICINE

## 2025-02-24 PROCEDURE — 99395 PREV VISIT EST AGE 18-39: CPT | Mod: 25 | Performed by: FAMILY MEDICINE

## 2025-02-24 PROCEDURE — 96127 BRIEF EMOTIONAL/BEHAV ASSMT: CPT | Performed by: FAMILY MEDICINE

## 2025-02-24 PROCEDURE — 90471 IMMUNIZATION ADMIN: CPT | Performed by: FAMILY MEDICINE

## 2025-02-24 RX ORDER — LEVOTHYROXINE SODIUM 25 UG/1
25 TABLET ORAL DAILY
Qty: 90 TABLET | Refills: 3 | Status: SHIPPED | OUTPATIENT
Start: 2025-02-24

## 2025-02-24 RX ORDER — VENLAFAXINE HYDROCHLORIDE 150 MG/1
150 CAPSULE, EXTENDED RELEASE ORAL DAILY
Qty: 90 CAPSULE | Refills: 3 | Status: SHIPPED | OUTPATIENT
Start: 2025-02-24

## 2025-02-24 ASSESSMENT — PAIN SCALES - GENERAL: PAINLEVEL_OUTOF10: NO PAIN (0)

## 2025-02-24 NOTE — PROGRESS NOTES
Preventive Care Visit  [facility] [place]  Lm Bear MD, Family Medicine  Feb 24, 2025      Assessment & Plan     Routine general medical examination at a health care facility    - Comprehensive metabolic panel  - Lipid panel reflex to direct LDL Fasting  - CBC with platelets  - UA Macroscopic with reflex to Microscopic and Culture  - TSH  - T4, free  - PRIMARY CARE FOLLOW-UP SCHEDULING  - REVIEW OF HEALTH MAINTENANCE PROTOCOL ORDERS  - Comprehensive metabolic panel  - Lipid panel reflex to direct LDL Fasting  - CBC with platelets  - TSH  - T4, free  - PRIMARY CARE FOLLOW-UP SCHEDULING    Major depressive disorder, single episode, mild    - TSH  - T4, free  - PRIMARY CARE FOLLOW-UP SCHEDULING  - REVIEW OF HEALTH MAINTENANCE PROTOCOL ORDERS  - TSH  - T4, free  - PRIMARY CARE FOLLOW-UP SCHEDULING  - venlafaxine (EFFEXOR XR) 150 MG 24 hr capsule  Dispense: 90 capsule; Refill: 3  - OFFICE/OUTPT VISIT,EST,LEVL III    Anxiety    - TSH  - T4, free  - PRIMARY CARE FOLLOW-UP SCHEDULING  - REVIEW OF HEALTH MAINTENANCE PROTOCOL ORDERS  - TSH  - T4, free  - PRIMARY CARE FOLLOW-UP SCHEDULING  - venlafaxine (EFFEXOR XR) 150 MG 24 hr capsule  Dispense: 90 capsule; Refill: 3  - OFFICE/OUTPT VISIT,EST,LEVL III    Subclinical hypothyroidism    - TSH  - T4, free  - PRIMARY CARE FOLLOW-UP SCHEDULING  - REVIEW OF HEALTH MAINTENANCE PROTOCOL ORDERS  - TSH  - T4, free  - PRIMARY CARE FOLLOW-UP SCHEDULING  - levothyroxine (SYNTHROID/LEVOTHROID) 25 MCG tablet  Dispense: 90 tablet; Refill: 3  - OFFICE/OUTPT VISIT,EST,LEVL III    Hashimoto's thyroiditis    - TSH  - T4, free  - PRIMARY CARE FOLLOW-UP SCHEDULING  - REVIEW OF HEALTH MAINTENANCE PROTOCOL ORDERS  - TSH  - T4, free  - PRIMARY CARE FOLLOW-UP SCHEDULING  - OFFICE/OUTPT VISIT,EST,LEVL III    Anti-TPO antibodies present    - TSH  - T4, free  - PRIMARY CARE FOLLOW-UP SCHEDULING  - REVIEW OF HEALTH MAINTENANCE PROTOCOL ORDERS  - TSH  - T4, free  - PRIMARY CARE  "FOLLOW-UP SCHEDULING  - OFFICE/OUTPT VISIT,GOMEZ WANG III    Hyperlipidemia LDL goal <160    - Lipid panel reflex to direct LDL Fasting  - REVIEW OF HEALTH MAINTENANCE PROTOCOL ORDERS  - Lipid panel reflex to direct LDL Fasting  - PRIMARY CARE FOLLOW-UP SCHEDULING  - OFFICE/OUTPT VISIT,EST,LEVL III    Microscopic hematuria    - REVIEW OF HEALTH MAINTENANCE PROTOCOL ORDERS  - UA Macroscopic with reflex to Microscopic and Culture - Lab Collect  - Urine Culture Aerobic Bacterial - lab collect  - UA Microscopic with Reflex to Culture  - PRIMARY CARE FOLLOW-UP SCHEDULING  - OFFICE/OUTPT VISIT,EST,LEVL III    Morbid obesity (H)    - PRIMARY CARE FOLLOW-UP SCHEDULING  - REVIEW OF HEALTH MAINTENANCE PROTOCOL ORDERS  - PRIMARY CARE FOLLOW-UP SCHEDULING  - OFFICE/OUTPT VISIT,JAROCHO WANGL III    Medication monitoring encounter    - Comprehensive metabolic panel  - Lipid panel reflex to direct LDL Fasting  - CBC with platelets  - UA Macroscopic with reflex to Microscopic and Culture  - TSH  - T4, free  - PRIMARY CARE FOLLOW-UP SCHEDULING  - REVIEW OF HEALTH MAINTENANCE PROTOCOL ORDERS  - Comprehensive metabolic panel  - Lipid panel reflex to direct LDL Fasting  - CBC with platelets  - TSH  - T4, free  - PRIMARY CARE FOLLOW-UP SCHEDULING  - OFFICE/OUTPT VISIT,GOMEZ WANG III    Need for COVID-19 vaccine    - PRIMARY CARE FOLLOW-UP SCHEDULING  - COVID-19 12+ (PFIZER)  - REVIEW OF HEALTH MAINTENANCE PROTOCOL ORDERS  - PRIMARY CARE FOLLOW-UP SCHEDULING  - OFFICE/OUTPT VISIT,GOMEZ WANG III    Need for HPV vaccination    - HPV9 (GARDASIL 9)  - OFFICE/OUTPT VISIT,FELIPELEVL III    Patient has been advised of split billing requirements and indicates understanding: Yes    BMI  Estimated body mass index is 37.27 kg/m  as calculated from the following:    Height as of this encounter: 1.867 m (6' 1.5\").    Weight as of this encounter: 129.9 kg (286 lb 6.4 oz).   Weight management plan: diet and exercise    Counseling  Appropriate preventive services " were addressed with this patient via screening, questionnaire, or discussion as appropriate for fall prevention, nutrition, physical activity, Tobacco-use cessation, social engagement, weight loss and cognition.  Checklist reviewing preventive services available has been given to the patient.  Reviewed patient's diet, addressing concerns and/or questions.     Work on weight loss  Regular exercise    Plan:    1) Medications: reviewed, refilled, watch BP    2) Labs: pending    3) Immunizations: desires covid, HPV, declines flu    4) Imaging/Diagnostics: NA    5) Consults: NA    Return in about 1 year (around 2/24/2026) for Complete Physical, Medication Recheck Visit, Follow Up Chronic.    24 minutes spent by myself on the date of the encounter doing chart review, history and exam, documentation and further activities per the note.    The longitudinal plan of care for the diagnosis(es)/condition(s) as documented were addressed during this visit. Due to the added complexity in care, I will continue to support Pat in the subsequent management and with ongoing continuity of care.    Shared Decision making completed.    Tomasa Styles is a 24 year old, presenting for the following:  Physical        2/24/2025     2:12 PM   Additional Questions   Roomed by Paty SANTA   Accompanied by Self     Depression and Anxiety     How are you doing with your depression since your last visit? Improved   How are you doing with your anxiety since your last visit?  Improved   Are you having other symptoms that might be associated with depression or anxiety? No  Have you had a significant life event? No   Do you have any concerns with your use of alcohol or other drugs? No        2/20/2024     6:48 AM 12/5/2024     8:41 AM 2/21/2025     8:31 AM   PHQ   PHQ-9 Total Score 3 4  2    Q9: Thoughts of better off dead/self-harm past 2 weeks Not at all Not at all Not at all       Patient-reported         1/30/2024     9:01 AM 2/20/2024     6:49 AM  2/21/2025     8:31 AM   SERGIO-7 SCORE   Total Score 4 (minimal anxiety) 2 (minimal anxiety) 0 (minimal anxiety)   Total Score 4    4 2 0        Patient-reported         2/21/2025     8:31 AM   Last PHQ-9   1.  Little interest or pleasure in doing things 0   2.  Feeling down, depressed, or hopeless 0   3.  Trouble falling or staying asleep, or sleeping too much 0   4.  Feeling tired or having little energy 1   5.  Poor appetite or overeating 1   6.  Feeling bad about yourself 0   7.  Trouble concentrating 0   8.  Moving slowly or restless 0   Q9: Thoughts of better off dead/self-harm past 2 weeks 0   PHQ-9 Total Score 2        Patient-reported         2/21/2025     8:31 AM   SERGIO-7    1. Feeling nervous, anxious, or on edge 0   2. Not being able to stop or control worrying 0   3. Worrying too much about different things 0   4. Trouble relaxing 0   5. Being so restless that it is hard to sit still 0   6. Becoming easily annoyed or irritable 0   7. Feeling afraid, as if something awful might happen 0   SERGIO-7 Total Score 0    If you checked any problems, how difficult have they made it for you to do your work, take care of things at home, or get along with other people? Not difficult at all       Patient-reported       Suicide Assessment Five-step Evaluation and Treatment (SAFE-T)      Hypothyroidism Follow-up    Since last visit, patient describes the following symptoms: dry skin    TSH   Date Value Ref Range Status   02/20/2024 2.55 0.30 - 4.20 uIU/mL Final   10/14/2022 8.22 (H) 0.40 - 4.00 mU/L Final   01/02/2017 3.49 0.40 - 4.00 mU/L Final     Lipids    Recent Labs   Lab Test 02/20/24  0737 01/12/23  1535   CHOL 211* 227*   HDL 43 30*   * 166*   TRIG 70 154*     Health Care Directive  Patient does not have a Health Care Directive: reviewed        2/21/2025   General Health   How would you rate your overall physical health? (!) FAIR   Feel stress (tense, anxious, or unable to sleep) Not at all         2/21/2025    Nutrition   Three or more servings of calcium each day? Yes   Diet: Regular (no restrictions)   How many servings of fruit and vegetables per day? (!) 2-3   How many sweetened beverages each day? 0-1         2/21/2025   Exercise   Days per week of moderate/strenous exercise 4 days   Average minutes spent exercising at this level 30 min         2/21/2025   Social Factors   Frequency of gathering with friends or relatives Twice a week   Worry food won't last until get money to buy more No   Food not last or not have enough money for food? No   Do you have housing? (Housing is defined as stable permanent housing and does not include staying ouside in a car, in a tent, in an abandoned building, in an overnight shelter, or couch-surfing.) Yes   Are you worried about losing your housing? No   Lack of transportation? No   Unable to get utilities (heat,electricity)? No         2/21/2025   Dental   Dentist two times every year? Yes         2/13/2024   TB Screening   Were you born outside of the [place]? No       Today's PHQ-9 Score:       2/21/2025     8:31 AM   PHQ-9 SCORE   PHQ-9 Total Score MyChart 2 (Minimal depression)   PHQ-9 Total Score 2        Patient-reported         2/21/2025   Substance Use   Alcohol more than 3/day or more than 7/wk No   Do you use any other substances recreationally? No     Social History     Tobacco Use    Smoking status: Never    Smokeless tobacco: Never   Vaping Use    Vaping status: Never Used   Substance Use Topics    Alcohol use: Yes     Alcohol/week: 0.0 - 2.0 standard drinks of alcohol     Comment: Once or twice a month    Drug use: No             2/21/2025   One time HIV Screening   Previous HIV test? I don't know         2/21/2025   STI Screening   New sexual partner(s) since last STI/HIV test? No         2/21/2025   Contraception/Family Planning   Questions about contraception or family planning No        Reviewed and updated as needed this visit by Provider                  Patient  Active Problem List   Diagnosis    Major depressive disorder, single episode, mild    Anxiety    Morbid obesity (H)    Hashimoto's thyroiditis    Subclinical hypothyroidism    Anti-TPO antibodies present    Hyperlipidemia LDL goal <160       Past Medical History:   Diagnosis Date    Anxiety     heart murmur congenital    asymptomatic    Hyperlipidemia LDL goal <160     Major depressive disorder, single episode, mild     Subclinical hypothyroidism 06/29/2023    Hashimoto's       Past Surgical History:   Procedure Laterality Date    NO HISTORY OF SURGERY         Current Outpatient Medications   Medication Sig Dispense Refill    levothyroxine (SYNTHROID/LEVOTHROID) 25 MCG tablet Take 1 tablet (25 mcg) by mouth daily. 90 tablet 3    venlafaxine (EFFEXOR XR) 150 MG 24 hr capsule Take 1 capsule (150 mg) by mouth daily. 90 capsule 3       Allergies   Allergen Reactions    Sulfa Antibiotics Rash     Red and blotchy rash, happened when pt was a few years old    Zithromax [Azithromycin Dihydrate] Rash     Red and blotchy rash, happened when pt was a few years old.       Family History   Problem Relation Age of Onset    Anxiety Disorder Mother     Thyroid Disease Mother     Thyroid Disease Father     Anxiety Disorder Brother     Anxiety Disorder Brother     Anxiety Disorder Brother     Depression Maternal Grandmother         as well as other maternal side relatives    Hypertension Maternal Grandmother     Diabetes Maternal Grandfather         bilateral amputee    Coronary Artery Disease Maternal Grandfather         multiple MI's    Hypertension Maternal Grandfather     Hyperlipidemia Maternal Grandfather     Brain Cancer Paternal Grandfather         Skin cancer origin?    Anesthesia Reaction Paternal Grandfather     Asthma Paternal Grandfather        Social History     Socioeconomic History    Marital status: Single     Spouse name: None    Number of children: 0    Years of education: None    Highest education level: None    Occupational History    Occupation: [employer]     Employer: STUDENT     Comment: Biology   Tobacco Use    Smoking status: Never    Smokeless tobacco: Never   Vaping Use    Vaping status: Never Used   Substance and Sexual Activity    Alcohol use: Yes     Alcohol/week: 0.0 - 2.0 standard drinks of alcohol     Comment: Once or twice a month    Drug use: No    Sexual activity: Not Currently     Partners: Female     Birth control/protection: Condom   Other Topics Concern    Caffeine Concern Yes     Comment: rare    Exercise Yes     Comment: FB -2 hrs > 4 times per week    Bike Helmet Yes    Seat Belt Yes    Parent/sibling w/ CABG, MI or angioplasty before 65F 55M? No     Social Drivers of Health     Financial Resource Strain: Low Risk  (2/21/2025)    Financial Resource Strain     Within the past 12 months, have you or your family members you live with been unable to get utilities (heat, electricity) when it was really needed?: No   Food Insecurity: Low Risk  (2/21/2025)    Food Insecurity     Within the past 12 months, did you worry that your food would run out before you got money to buy more?: No     Within the past 12 months, did the food you bought just not last and you didn t have money to get more?: No   Transportation Needs: Low Risk  (2/21/2025)    Transportation Needs     Within the past 12 months, has lack of transportation kept you from medical appointments, getting your medicines, non-medical meetings or appointments, work, or from getting things that you need?: No   Physical Activity: Insufficiently Active (2/21/2025)    Exercise Vital Sign     Days of Exercise per Week: 4 days     Minutes of Exercise per Session: 30 min   Stress: No Stress Concern Present (2/21/2025)    [place] [employer] - Occupational Stress Questionnaire     Feeling of Stress : Not at all   Social Connections: Unknown (2/21/2025)    Social Connection and Isolation Panel [NHANES]     Frequency of Social  "Gatherings with Friends and Family: Twice a week   Interpersonal Safety: Low Risk  (2/24/2025)    Interpersonal Safety     Do you feel physically and emotionally safe where you currently live?: Yes     Within the past 12 months, have you been hit, slapped, kicked or otherwise physically hurt by someone?: No     Within the past 12 months, have you been humiliated or emotionally abused in other ways by your partner or ex-partner?: No   Housing Stability: Low Risk  (2/21/2025)    Housing Stability     Do you have housing? : Yes     Are you worried about losing your housing?: No     Colonoscopy:  due at age 44  FIT / Cologuard: due at 39  PSA: due at 39      Review of Systems  CONSTITUTIONAL: NEGATIVE for fever, chills, change in weight  INTEGUMENTARY/SKIN: NEGATIVE for worrisome rashes, moles or lesions  EYES: NEGATIVE for vision changes or irritation  ENT/MOUTH: NEGATIVE for ear, mouth and throat problems  RESP: NEGATIVE for significant cough or SOB  CV: NEGATIVE for chest pain, palpitations or peripheral edema  GI: NEGATIVE for nausea, abdominal pain, heartburn, or change in bowel habits  : NEGATIVE for frequency, dysuria, or hematuria  MUSCULOSKELETAL: NEGATIVE for significant arthralgias or myalgia  NEURO: NEGATIVE for weakness, dizziness or paresthesias  ENDOCRINE: NEGATIVE for temperature intolerance, skin/hair changes  HEME: NEGATIVE for bleeding problems  PSYCHIATRIC: NEGATIVE for changes in mood or affect     Objective    Exam  /82 (BP Location: Left arm)   Pulse 72   Temp 97.4  F (36.3  C) (Tympanic)   Resp 18   Ht 1.867 m (6' 1.5\")   Wt 129.9 kg (286 lb 6.4 oz)   SpO2 98%   BMI 37.27 kg/m     Estimated body mass index is 37.27 kg/m  as calculated from the following:    Height as of this encounter: 1.867 m (6' 1.5\").    Weight as of this encounter: 129.9 kg (286 lb 6.4 oz).    Physical Exam  GENERAL: alert and no distress  EYES: Eyes grossly normal to inspection, PERRL and conjunctivae and " sclerae normal  HENT: ear canals and TM's normal, nose and mouth without ulcers or lesions  NECK: no adenopathy, no asymmetry, masses, or scars  RESP: lungs clear to auscultation - no rales, rhonchi or wheezes  CV: regular rate and rhythm, normal S1 S2, no S3 or S4, no murmur, click or rub, no peripheral edema  ABDOMEN: soft, nontender, no hepatosplenomegaly, no masses and bowel sounds normal   (male): pt declines  RECTAL: due at age 39  MS: no gross musculoskeletal defects noted, no edema  SKIN: no suspicious lesions or rashes  NEURO: Normal strength and tone, mentation intact and speech normal  PSYCH: mentation appears normal, affect normal/bright    Signed Electronically by:            Lm Bear MD, FAAFP     [facility]  [address] [place] [id number]  [email or web address]   Office: [phone]  Fax: [phone]         Answers submitted by the patient for this visit:  Patient Health Questionnaire (Submitted on 2/21/2025)  If you checked off any problems, how difficult have these problems made it for you to do your work, take care of things at home, or get along with other people?: Not difficult at all  PHQ9 TOTAL SCORE: 2  Patient Health Questionnaire (G7) (Submitted on 2/21/2025)  SERGIO 7 TOTAL SCORE: 0

## 2025-02-25 LAB
ALBUMIN SERPL BCG-MCNC: 4.8 G/DL (ref 3.5–5.2)
ALP SERPL-CCNC: 86 U/L (ref 40–150)
ALT SERPL W P-5'-P-CCNC: 77 U/L (ref 0–70)
ANION GAP SERPL CALCULATED.3IONS-SCNC: 13 MMOL/L (ref 7–15)
AST SERPL W P-5'-P-CCNC: 39 U/L (ref 0–45)
BACTERIA UR CULT: NO GROWTH
BILIRUB SERPL-MCNC: 0.3 MG/DL
BUN SERPL-MCNC: 12.1 MG/DL (ref 6–20)
CALCIUM SERPL-MCNC: 9.6 MG/DL (ref 8.8–10.4)
CHLORIDE SERPL-SCNC: 103 MMOL/L (ref 98–107)
CHOLEST SERPL-MCNC: 203 MG/DL
CREAT SERPL-MCNC: 0.98 MG/DL (ref 0.67–1.17)
EGFRCR SERPLBLD CKD-EPI 2021: >90 ML/MIN/1.73M2
FASTING STATUS PATIENT QL REPORTED: ABNORMAL
FASTING STATUS PATIENT QL REPORTED: ABNORMAL
GLUCOSE SERPL-MCNC: 71 MG/DL (ref 70–99)
HCO3 SERPL-SCNC: 24 MMOL/L (ref 22–29)
HDLC SERPL-MCNC: 31 MG/DL
LDLC SERPL CALC-MCNC: 148 MG/DL
NONHDLC SERPL-MCNC: 172 MG/DL
POTASSIUM SERPL-SCNC: 3.9 MMOL/L (ref 3.4–5.3)
PROT SERPL-MCNC: 8 G/DL (ref 6.4–8.3)
SODIUM SERPL-SCNC: 140 MMOL/L (ref 135–145)
T4 FREE SERPL-MCNC: 1.14 NG/DL (ref 0.9–1.7)
TRIGL SERPL-MCNC: 121 MG/DL
TSH SERPL DL<=0.005 MIU/L-ACNC: 5.43 UIU/ML (ref 0.3–4.2)

## 2025-03-01 DIAGNOSIS — Z51.81 MEDICATION MONITORING ENCOUNTER: ICD-10-CM

## 2025-03-01 DIAGNOSIS — E03.8 SUBCLINICAL HYPOTHYROIDISM: ICD-10-CM

## 2025-03-01 DIAGNOSIS — E78.5 HYPERLIPIDEMIA LDL GOAL <160: Primary | ICD-10-CM

## 2025-03-01 DIAGNOSIS — R76.8 ANTI-TPO ANTIBODIES PRESENT: ICD-10-CM

## 2025-03-01 DIAGNOSIS — E06.3 HASHIMOTO'S THYROIDITIS: ICD-10-CM

## 2025-03-01 RX ORDER — LEVOTHYROXINE SODIUM 50 UG/1
50 TABLET ORAL
Qty: 90 TABLET | Refills: 3 | Status: SHIPPED | OUTPATIENT
Start: 2025-03-01

## 2025-05-07 ENCOUNTER — MYC MEDICAL ADVICE (OUTPATIENT)
Dept: FAMILY MEDICINE | Facility: CLINIC | Age: 25
End: 2025-05-07
Payer: COMMERCIAL

## 2025-06-03 ENCOUNTER — ANCILLARY PROCEDURE (OUTPATIENT)
Dept: GENERAL RADIOLOGY | Facility: CLINIC | Age: 25
End: 2025-06-03
Attending: NURSE PRACTITIONER
Payer: COMMERCIAL

## 2025-06-03 ENCOUNTER — OFFICE VISIT (OUTPATIENT)
Dept: FAMILY MEDICINE | Facility: CLINIC | Age: 25
End: 2025-06-03
Payer: COMMERCIAL

## 2025-06-03 VITALS
DIASTOLIC BLOOD PRESSURE: 68 MMHG | TEMPERATURE: 97.2 F | WEIGHT: 280 LBS | SYSTOLIC BLOOD PRESSURE: 138 MMHG | HEART RATE: 94 BPM | OXYGEN SATURATION: 98 % | BODY MASS INDEX: 36.44 KG/M2

## 2025-06-03 DIAGNOSIS — M79.671 RIGHT FOOT PAIN: Primary | ICD-10-CM

## 2025-06-03 DIAGNOSIS — M79.671 RIGHT FOOT PAIN: ICD-10-CM

## 2025-06-03 PROCEDURE — 73630 X-RAY EXAM OF FOOT: CPT | Mod: TC | Performed by: STUDENT IN AN ORGANIZED HEALTH CARE EDUCATION/TRAINING PROGRAM

## 2025-06-03 PROCEDURE — 3050F LDL-C >= 130 MG/DL: CPT | Performed by: NURSE PRACTITIONER

## 2025-06-03 PROCEDURE — 80061 LIPID PANEL: CPT | Performed by: NURSE PRACTITIONER

## 2025-06-03 PROCEDURE — 90471 IMMUNIZATION ADMIN: CPT | Performed by: NURSE PRACTITIONER

## 2025-06-03 PROCEDURE — 84443 ASSAY THYROID STIM HORMONE: CPT | Performed by: NURSE PRACTITIONER

## 2025-06-03 PROCEDURE — 84439 ASSAY OF FREE THYROXINE: CPT | Performed by: NURSE PRACTITIONER

## 2025-06-03 PROCEDURE — 3075F SYST BP GE 130 - 139MM HG: CPT | Performed by: NURSE PRACTITIONER

## 2025-06-03 PROCEDURE — 90651 9VHPV VACCINE 2/3 DOSE IM: CPT | Performed by: NURSE PRACTITIONER

## 2025-06-03 PROCEDURE — 3078F DIAST BP <80 MM HG: CPT | Performed by: NURSE PRACTITIONER

## 2025-06-03 PROCEDURE — 99213 OFFICE O/P EST LOW 20 MIN: CPT | Mod: 25 | Performed by: NURSE PRACTITIONER

## 2025-06-03 PROCEDURE — 36415 COLL VENOUS BLD VENIPUNCTURE: CPT | Performed by: NURSE PRACTITIONER

## 2025-06-03 NOTE — PROGRESS NOTES
Assessment & Plan     Jensen was seen today for foot pain and immunization.    Diagnoses and all orders for this visit:    Right foot pain  Chronic 2 year history of right foot pain. Plan further consultation with podiatry.      -     XR Foot Right G/E 3 Views; Future  -     Orthopedic  Referral; Future      Return in about 2 weeks (around 6/17/2025) for consultation with podiatry.          Subjective   Jensen is a 24 year old, presenting for the following health issues:  Foot Pain (Right) and Immunization (HPV )        6/3/2025     3:48 PM   Additional Questions   Roomed by Theodore GUTIERREZ CMA     History of Present Illness       Reason for visit:  Foot arch pain  Symptom onset:  More than a month  Symptoms include:  Pain in foot arch/ankle/heel, nerve pain in foot  Symptom intensity:  Moderate  Symptom progression:  Staying the same  Had these symptoms before:  Yes  Has tried/received treatment for these symptoms:  Yes  Previous treatment was successful:  No  What makes it worse:  Standing/walking for a long time  What makes it better:  No   He is taking medications regularly.        Concern - Foot Pain - Right  Onset: ongoing x 2 years, progressively getting worse, arch of Right foot. No known injury.     Started after wearing cleats during a softball game 2 years ago.  No injury during softball game.    Has tried 5 different shoes since then.   Shooting nerve pain with certain movements.      Consultation in 2023 for ankle sprain. Tried physical therapy x1-2 times without improvement.      No worsening of pain with getting up from prolonged resting or sitting.     Went to chiropractor at one point and got insoles, no noted improvement.      Intensity: moderate, but can go from mild to severe within minutes.   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      Review of Systems  Constitutional, HEENT, cardiovascular, pulmonary, gi and gu systems are negative, except as otherwise noted.      Objective    /68   Pulse 94   Temp 97.2  F (36.2  C)   Wt 127 kg (280 lb)   SpO2 98%   BMI 36.44 kg/m    Body mass index is 36.44 kg/m .    Physical Exam     GENERAL: alert and no distress  MS: right foot and ankle without erythema and swelling, right medial aspect of foot with tenderness to palpation, no achilles or heel/plantar fascia region tenderness to palpation  PSYCH: mentation appears normal, affect normal/bright      Signed Electronically by: MARIAN Velazquez CNP

## 2025-06-04 ENCOUNTER — PATIENT OUTREACH (OUTPATIENT)
Dept: CARE COORDINATION | Facility: CLINIC | Age: 25
End: 2025-06-04
Payer: COMMERCIAL

## 2025-06-04 LAB
CHOLEST SERPL-MCNC: 242 MG/DL
FASTING STATUS PATIENT QL REPORTED: YES
HDLC SERPL-MCNC: 35 MG/DL
LDLC SERPL CALC-MCNC: 182 MG/DL
NONHDLC SERPL-MCNC: 207 MG/DL
T4 FREE SERPL-MCNC: 1.17 NG/DL (ref 0.9–1.7)
TRIGL SERPL-MCNC: 127 MG/DL
TSH SERPL DL<=0.005 MIU/L-ACNC: 3.92 UIU/ML (ref 0.3–4.2)

## 2025-06-05 ENCOUNTER — RESULTS FOLLOW-UP (OUTPATIENT)
Dept: FAMILY MEDICINE | Facility: CLINIC | Age: 25
End: 2025-06-05

## 2025-06-07 ENCOUNTER — RESULTS FOLLOW-UP (OUTPATIENT)
Dept: FAMILY MEDICINE | Facility: CLINIC | Age: 25
End: 2025-06-07

## 2025-06-07 DIAGNOSIS — E03.8 SUBCLINICAL HYPOTHYROIDISM: ICD-10-CM

## 2025-06-07 DIAGNOSIS — Z51.81 MEDICATION MONITORING ENCOUNTER: ICD-10-CM

## 2025-06-07 DIAGNOSIS — E06.3 HASHIMOTO'S THYROIDITIS: ICD-10-CM

## 2025-06-07 DIAGNOSIS — E78.5 HYPERLIPIDEMIA LDL GOAL <160: Primary | ICD-10-CM

## 2025-06-07 DIAGNOSIS — R76.8 ANTI-TPO ANTIBODIES PRESENT: ICD-10-CM

## 2025-06-07 DIAGNOSIS — F41.9 ANXIETY: ICD-10-CM

## 2025-06-07 DIAGNOSIS — F32.0 MAJOR DEPRESSIVE DISORDER, SINGLE EPISODE, MILD: ICD-10-CM

## 2025-06-07 DIAGNOSIS — E66.01 MORBID OBESITY (H): ICD-10-CM

## 2025-06-12 ENCOUNTER — OFFICE VISIT (OUTPATIENT)
Dept: PODIATRY | Facility: CLINIC | Age: 25
End: 2025-06-12
Payer: COMMERCIAL

## 2025-06-12 DIAGNOSIS — M21.42 PES PLANUS OF BOTH FEET: Primary | ICD-10-CM

## 2025-06-12 DIAGNOSIS — M79.671 RIGHT FOOT PAIN: ICD-10-CM

## 2025-06-12 DIAGNOSIS — M25.872 ANKLE IMPINGEMENT SYNDROME, LEFT: ICD-10-CM

## 2025-06-12 DIAGNOSIS — M21.41 PES PLANUS OF BOTH FEET: Primary | ICD-10-CM

## 2025-06-12 DIAGNOSIS — M76.61 RIGHT ACHILLES TENDINITIS: ICD-10-CM

## 2025-06-12 NOTE — PROGRESS NOTES
"ASSESSMENT:  Encounter Diagnoses   Name Primary?    Pes planus of both feet Yes    Right foot pain     Right Achilles tendinitis     lateral Ankle impingement syndrome, left      MEDICAL DECISION MAKING:  I personally reviewed the right foot x-ray images.  No acute findings.  Nonweightbearing yet pes planus appearing  Fault at the naviculocuneiform joint    Achilles tendinitis on the right:  The functional relevant anatomy was discussed  Ankle Tri-Lock brace with foot strap medial  Referral to physical therapy    His other pain is likely flatfoot related Inc. involving the posterior tibial tendon or other stress medial structures as well as lateral ankle impingement.  John Paul Durham is referred to [facility] for prescription orthoses.        Disclaimer: This note consists of symbols derived from keyboarding, dictation and/or voice recognition software. As a result, there may be errors in the script that have gone undetected. Please consider this when interpreting information found in this chart.    Rasheed Oh DPM, FACFAS, MS    [facility]      ____________________________________________________________________    HPI:       I was asked by Charlene Rutledge APRN CNP  to evaluate John Paul Durham in consultation for right foot pain.       John Paul Durham presents reporting right foot pain of 2 years duration.  Reports \"general pain in many parts of the foot and ankle, shooting nerve pain from arch of foot sometimes.  \"  Pain experience on most days  It is worse with prolonged weightbearing activities.  Ice, ibuprofen, physical therapy  Exercise activities include walking his dog, softball and pickleball.    He specifies pain in the right Achilles tendon region, medial rear foot along the course of the posterior tibial tendon and anterior lateral right ankle.    *  Past Medical History:   Diagnosis Date    Anxiety     heart murmur congenital    " asymptomatic    Hyperlipidemia LDL goal <160     Major depressive disorder, single episode, mild     Subclinical hypothyroidism 06/29/2023    Hashimoto's   *  *  Past Surgical History:   Procedure Laterality Date    NO HISTORY OF SURGERY     *  *  Current Outpatient Medications   Medication Sig Dispense Refill    levothyroxine (SYNTHROID/LEVOTHROID) 50 MCG tablet Take 1 tablet (50 mcg) by mouth every morning (before breakfast). 90 tablet 3    venlafaxine (EFFEXOR XR) 150 MG 24 hr capsule Take 1 capsule (150 mg) by mouth daily. 90 capsule 3         EXAM:    Vitals: There were no vitals taken for this visit.  BMI: There is no height or weight on file to calculate BMI.    Vasc:      Pedal pulses are palpable for the dorsalis pedis posterior tibial artery, bilateral foot.  Capillary fill time </= 3 seconds  Pedal skin appears well-perfused  Neuro:      Light touch sensation intact to all sensory nerve distributions, bilateral foot.  No apparent spastic contractures or other deformity secondary to neurologic compromise.  Derm:      No calluses  No wounds   No worrisome lesions  MSK:      With weightbearing he demonstrates a decrease in the medial longitudinal arch and forefoot abduction.  Both feet are relatively flexible and can be manipulated into a more rectus position without creating ankle equinus.  Bilateral lower extremity muscle strength presents is normal.  Adequate ankle and subtalar joint range of motion  Calf:    Neg for redness, swelling or tenderness    EXAM: XR FOOT RIGHT G/E 3 VIEWS  LOCATION: [facility]  DATE: 6/3/2025     INDICATION:  Right foot pain  COMPARISON: None.                                                                      IMPRESSION: Normal joint spaces and alignment. No fracture.

## 2025-06-12 NOTE — PATIENT INSTRUCTIONS
Thank you for choosing [facility] Podiatry / Foot & Ankle Surgery!    DR. DE'S CLINIC LOCATIONS:     [facility] TRIAGE LINE: [phone]   [address] APPOINTMENTS: [phone]   [place] [address] RADIOLOGY: [phone]   (Every other Tues - Wed - Fri PM) SET UP SURGERY: [phone]    PHYSICAL THERAPY: [phone]   [place] SPECIALTY BILLING QUESTIONS: [phone] [address] FAX: [phone]   [place] [address]    (Thurs & Fri AM)       ACHILLES TENDONITIS  Therapies discussed today:  1.  Supportive shoes, minimizing barefoot ambulation - helps to provide cushion, padding and support to the tendon that is inflamed.  Socks, flip flops, flats and some slippers are not typically sufficient to provide support, although choose shoe gear based on comfort.  Shoes should be worn even in-doors  2.  Insert/orthotics - inserts/orthotics that have an arch support and heel lift built in to them provide further stress relief for the tendon.  3. Icing - applying ice to the back of the heel can help to alleviate discomfort.  Apply ice to the area every few hours x 10-15 minutes as needed based on pain levels.  4.  Anti-inflammatory(NSAIDS)/Tylenol - anti-inflammatories, such as ibuprofen or Aleve, as well as Tylenol can be used to help decrease symptoms and improve pain levels.  If you have high blood pressure, heart disease, stomach or kidney problems, use anti-inflammatories sparingly.  Tylenol should not be used if you have liver problems.  If you can safely taken them, you can use NSAIDS and tylenol in combination for pain relief  5. Activity modifications - if there are certain things that you do, whether it's going barefoot or certain shoes/activities, you should try to minimize those activities as much as possible until your symptoms are sufficiently resolved.  Certainly, some activities, such as running on the treadmill, are easier to take a break from versus others,  "such as work or chores at home.  \"If there are certain activities that hurt your heel, and you keep doing those activities that hurt your heel, your heel will keep hurting\".    6.  Stretching - Stretching your Achilles and hamstring can help to decrease stress on the Achilles tendon.               Hold each stretch for 10 seconds.  Stretch 10 times per set, three sets per day.  Morning, afternoon and evening.  If your heel pain is very severe in the morning, consider doing the first set of stretches before you get out of bed.      *If these initial therapies are insufficient, we have our tier 2 therapies that can more aggressively work to improve your symptoms and get you back to the activities that you enjoy.       FLAT FEET   Flatfoot is often a complex disorder, with diverse symptoms and varying degrees of deformity and disability. There are several types of flatfoot, all of which have one characteristic in common: partial or total collapse (loss) of the arch.  Other characteristics shared by most types of flatfoot include:   Toe drift,  in which the toes and front part of the foot point outward   The heel tilts toward the outside and the ankle appears to turn in   A tight Achilles tendon, which causes the heel to lift off the ground earlier when walking and may make the problem worse   Bunions and hammertoes may develop as a result of a flatfoot.   Flexible Flatfoot  Flexible flatfoot is one of the most common types of flatfoot. It typically begins in childhood or adolescence and continues into adulthood. It usually occurs in both feet and progresses in severity throughout the adult years. As the deformity worsens, the soft tissues (tendons and ligaments) of the arch may stretch or tear and can become inflamed.  The term  flexible  means that while the foot is flat when standing (weight-bearing), the arch returns when not standing.  SYMPTOMS  Pain in the heel, arch, ankle, or along the outside of the foot "    Rolled-in  ankle (over-pronation)   Pain along the shin bone (shin splint)   General aching or fatigue in the foot or leg   Low back, hip or knee pain.   DIAGNOSIS  In diagnosing flatfoot, the foot and ankle surgeon examines the foot and observes how it looks when you stand and sit. X-rays are usually taken to determine the severity of the disorder. If you are diagnosed with flexible flatfoot but you don t have any symptoms, your surgeon will explain what you might expect in the future.  NON-SURGICAL TREATMENT  If you experience symptoms with flexible flatfoot, the surgeon may recommend non-surgical treatment options, including:  Activity modifications. Cut down on activities that bring you pain and avoid prolonged walking and standing to give your arches a rest.   Weight loss. If you are overweight, try to lose weight. Putting too much weight on your arches may aggravate your symptoms.   Orthotic devices. Your foot and ankle surgeon can provide you with custom orthotic devices for your shoes to give more support to the arches.   Immobilization. In some cases, it may be necessary to use a walking cast or to completely avoid weight-bearing.   Medications. Nonsteroidal anti-inflammatory drugs (NSAIDs), such as ibuprofen, help reduce pain and inflammation.   Physical therapy. Ultrasound therapy or other physical therapy modalities may be used to provide temporary relief.   Shoe modifications. Wearing shoes that support the arches is important for anyone who has flatfoot.   SURGICAL TREATMENT  In some patients whose pain is not adequately relieved by other treatments, surgery may be considered. A variety of surgical techniques is available to correct flexible flatfoot, and one or a combination of procedures may be required to relieve the symptoms and improve foot function.  In selecting the procedure or combination of procedures for your particular case, the foot and ankle surgeon will take into consideration the  extent of your deformity based on the x-ray findings, your age, your activity level, and other factors. The length of the recovery period will vary, depending on the procedure or procedures performed.  [facility] LOCATIONS  [place]  [address] [facility]  [phone]   [place] [id number]  [phone] [place]  [address]  [phone]   [place]  [address]  [phone]   [place]  [address]  [phone] [place]  [address] [place] [address] [place]  [phone]   [place]  [address]  [phone]   [place]  [address] [place] [address] [phone] [place]  [address]  [phone]        [facility] [facility]  [address]  [place] [address]  Phone: [phone]  Fax: [phone] [facility]   [address]  [place] [address]  Phone: [phone]  Fax: [phone]   [facility]  [address]  [place] [address]  Phone: [phone]  Fax: [phone] [facility]  [address]  [place] [address]  Phone: [phone]   Fax: [phone]   * Please call any location listed to make an appointment for a casting/fitting. Your referral was sent to their central office and they will all have the order on file.
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
